# Patient Record
Sex: MALE | Race: WHITE | NOT HISPANIC OR LATINO | Employment: OTHER | URBAN - METROPOLITAN AREA
[De-identification: names, ages, dates, MRNs, and addresses within clinical notes are randomized per-mention and may not be internally consistent; named-entity substitution may affect disease eponyms.]

---

## 2021-01-28 ENCOUNTER — IMMUNIZATIONS (OUTPATIENT)
Dept: FAMILY MEDICINE CLINIC | Facility: HOSPITAL | Age: 86
End: 2021-01-28

## 2021-01-28 DIAGNOSIS — Z23 ENCOUNTER FOR IMMUNIZATION: Primary | ICD-10-CM

## 2021-01-28 PROCEDURE — 91301 SARS-COV-2 / COVID-19 MRNA VACCINE (MODERNA) 100 MCG: CPT

## 2021-01-28 PROCEDURE — 0011A SARS-COV-2 / COVID-19 MRNA VACCINE (MODERNA) 100 MCG: CPT

## 2021-02-08 ENCOUNTER — TELEPHONE (OUTPATIENT)
Dept: GASTROENTEROLOGY | Facility: CLINIC | Age: 86
End: 2021-02-08

## 2021-02-08 NOTE — TELEPHONE ENCOUNTER
Patient called  He would like to schedule appt with Dr José Blunt  He said he would be willing to see a different provider so that he could be seen as soon as possible if Dr José Blunt doesn't have an available appt soon  He is having constipation and a lot of gas  He is taking laxatives with not much relief  Please call patient to schedule ov   Thank you

## 2021-02-11 ENCOUNTER — OFFICE VISIT (OUTPATIENT)
Dept: GASTROENTEROLOGY | Facility: CLINIC | Age: 86
End: 2021-02-11
Payer: COMMERCIAL

## 2021-02-11 VITALS
SYSTOLIC BLOOD PRESSURE: 148 MMHG | DIASTOLIC BLOOD PRESSURE: 76 MMHG | WEIGHT: 143 LBS | HEART RATE: 78 BPM | HEIGHT: 65 IN | BODY MASS INDEX: 23.82 KG/M2

## 2021-02-11 DIAGNOSIS — K59.09 OTHER CONSTIPATION: Primary | ICD-10-CM

## 2021-02-11 DIAGNOSIS — Z86.010 HISTORY OF ADENOMATOUS POLYP OF COLON: ICD-10-CM

## 2021-02-11 PROCEDURE — 99204 OFFICE O/P NEW MOD 45 MIN: CPT | Performed by: INTERNAL MEDICINE

## 2021-02-11 PROCEDURE — 1036F TOBACCO NON-USER: CPT | Performed by: INTERNAL MEDICINE

## 2021-02-11 PROCEDURE — 1160F RVW MEDS BY RX/DR IN RCRD: CPT | Performed by: INTERNAL MEDICINE

## 2021-02-11 RX ORDER — DOXAZOSIN MESYLATE 4 MG/1
TABLET ORAL
COMMUNITY
Start: 2020-11-16

## 2021-02-11 RX ORDER — ATORVASTATIN CALCIUM 10 MG/1
10 TABLET, FILM COATED ORAL DAILY
COMMUNITY

## 2021-02-11 RX ORDER — LORAZEPAM 1 MG/1
1 TABLET ORAL
COMMUNITY

## 2021-02-11 RX ORDER — GABAPENTIN 300 MG/1
300 CAPSULE ORAL 2 TIMES DAILY
COMMUNITY

## 2021-02-11 RX ORDER — POLYETHYLENE GLYCOL 3350 17 G/17G
17 POWDER, FOR SOLUTION ORAL DAILY
COMMUNITY

## 2021-02-11 NOTE — PROGRESS NOTES
[facility] - Outpatient Consultation  Gabriella Rooney 80 y o  male MRN: [id number]  Encounter: [id number]          ASSESSMENT AND PLAN:      1  Other constipation    2  History of adenomatous polyp of colon        History of constipation, denies any blood in stools, colonoscopy about 4 years ago had small polyp  Clinically no evidence of acute abdomen ileus obstruction  Encouraged him to increase fluid and fiber intake stool soft softener, and take MiraLax as it seems to be working  If any further symptoms they will call for a follow-up  Discussed with patient's wife as well   ______________________________________________________________________    HPI:        51-year-old pleasant Pleasant male, history of constipation, more so lately, taking MiraLax with some relief  Denies any blood in stools, denies any abdominal pain nausea vomiting dysphagia coughing choking spells nocturnal reflux regurgitation bronchitis pneumonias  Diet medications more than 10 pertinent systems reviewed  Denies any history of CVA seizures CAD  Quite alert awake independent for his age  Does consume a fair amount of fluid and fiber  REVIEW OF SYSTEMS:    CONSTITUTIONAL: Denies any fever, chills, rigors, and weight loss  HEENT: No earache or tinnitus  CARDIOVASCULAR: No chest pain or palpitations  RESPIRATORY: Denies any cough, hemoptysis, shortness of breath or dyspnea on exertion  GASTROINTESTINAL: As noted in the History of Present Illness  GENITOURINARY: Denies any hematuria or dysuria  NEUROLOGIC: No dizziness or vertigo  MUSCULOSKELETAL: Denies any joint swellings  SKIN: Denies skin rashes or itching  ENDOCRINE: Denies excessive thirst  Denies intolerance to heat or cold  PSYCHOSOCIAL: Denies depression or anxiety  Denies any recent memory loss         Historical Information   Past Medical History:   Diagnosis Date    Coronary artery disease     Hyperlipidemia      Past Surgical History:   Procedure Laterality Date    APPENDECTOMY      TOTAL HIP ARTHROPLASTY       Social History   Social History     Substance and Sexual Activity   Alcohol Use Yes     Social History     Substance and Sexual Activity   Drug Use Never     Social History     Tobacco Use   Smoking Status Former Smoker   Smokeless Tobacco Never Used     History reviewed  No pertinent family history  Meds/Allergies       Current Outpatient Medications:     ASPIRIN 81 PO    atorvastatin (LIPITOR) 10 mg tablet    doxazosin (CARDURA) 4 mg tablet    gabapentin (NEURONTIN) 300 mg capsule    LORazepam (ATIVAN) 1 mg tablet    polyethylene glycol (GlycoLax) 17 GM/SCOOP powder    No Known Allergies        Objective     Blood pressure 148/76, pulse 78, height 5' 5" (1 651 m), weight 64 9 kg (143 lb)  Body mass index is 23 8 kg/m²  PHYSICAL EXAM:      General Appearance:   Alert, cooperative, no distress   HEENT:   Normocephalic, atraumatic, anicteric  Neck:  Supple, symmetrical, trachea midline   Lungs:   Clear to auscultation bilaterally; no rales, rhonchi or wheezing; respirations unlabored    Heart[de-identified]   Regular rate and rhythm; no murmur  Abdomen:   Soft, non-tender, non-distended; normal bowel sounds; no masses, no organomegaly    Genitalia:   Deferred    Rectal:   Deferred    Extremities:  No cyanosis, clubbing or edema    Skin:  No jaundice, rashes, or lesions    Lymph nodes:  No palpable cervical lymphadenopathy        Lab Results:   No visits with results within 1 Day(s) from this visit  Latest known visit with results is:   No results found for any previous visit  Radiology Results:   No results found

## 2021-02-24 ENCOUNTER — IMMUNIZATIONS (OUTPATIENT)
Dept: FAMILY MEDICINE CLINIC | Facility: HOSPITAL | Age: 86
End: 2021-02-24

## 2021-02-24 DIAGNOSIS — Z23 ENCOUNTER FOR IMMUNIZATION: Primary | ICD-10-CM

## 2021-02-24 PROCEDURE — 0012A SARS-COV-2 / COVID-19 MRNA VACCINE (MODERNA) 100 MCG: CPT

## 2021-02-24 PROCEDURE — 91301 SARS-COV-2 / COVID-19 MRNA VACCINE (MODERNA) 100 MCG: CPT

## 2021-05-26 ENCOUNTER — APPOINTMENT (OUTPATIENT)
Dept: LAB | Facility: HOSPITAL | Age: 86
End: 2021-05-26
Attending: OTOLARYNGOLOGY
Payer: COMMERCIAL

## 2021-05-26 DIAGNOSIS — K11.7 XEROSTOMIA: ICD-10-CM

## 2021-05-26 LAB
BUN SERPL-MCNC: 15 MG/DL (ref 5–25)
CREAT SERPL-MCNC: 0.76 MG/DL (ref 0.6–1.3)
GFR SERPL CREATININE-BSD FRML MDRD: 82 ML/MIN/1.73SQ M

## 2021-05-26 PROCEDURE — 36415 COLL VENOUS BLD VENIPUNCTURE: CPT

## 2021-05-26 PROCEDURE — 84520 ASSAY OF UREA NITROGEN: CPT

## 2021-05-26 PROCEDURE — 82565 ASSAY OF CREATININE: CPT

## 2021-05-28 ENCOUNTER — HOSPITAL ENCOUNTER (OUTPATIENT)
Dept: CT IMAGING | Facility: HOSPITAL | Age: 86
Discharge: HOME | End: 2021-05-28
Attending: OTOLARYNGOLOGY
Payer: COMMERCIAL

## 2021-05-28 DIAGNOSIS — R07.0 THROAT PAIN IN ADULT: ICD-10-CM

## 2021-05-28 PROCEDURE — 70491 CT SOFT TISSUE NECK W/DYE: CPT

## 2021-05-28 RX ADMIN — IOHEXOL 100 ML: 350 INJECTION, SOLUTION INTRAVENOUS at 15:45

## 2021-06-14 ENCOUNTER — OFFICE VISIT (OUTPATIENT)
Dept: URGENT CARE | Facility: MEDICAL CENTER | Age: 86
End: 2021-06-14
Payer: COMMERCIAL

## 2021-06-14 VITALS
WEIGHT: 144 LBS | HEART RATE: 90 BPM | OXYGEN SATURATION: 96 % | TEMPERATURE: 98.4 F | BODY MASS INDEX: 22.6 KG/M2 | HEIGHT: 67 IN

## 2021-06-14 DIAGNOSIS — R50.9 FEVER, UNSPECIFIED FEVER CAUSE: Primary | ICD-10-CM

## 2021-06-14 LAB
SL AMB  POCT GLUCOSE, UA: NORMAL
SL AMB LEUKOCYTE ESTERASE,UA: NORMAL
SL AMB POCT BILIRUBIN,UA: NORMAL
SL AMB POCT BLOOD,UA: NORMAL
SL AMB POCT CLARITY,UA: CLEAR
SL AMB POCT COLOR,UA: YELLOW
SL AMB POCT KETONES,UA: NORMAL
SL AMB POCT NITRITE,UA: NORMAL
SL AMB POCT PH,UA: 7.5
SL AMB POCT SPECIFIC GRAVITY,UA: 1
SL AMB POCT URINE PROTEIN: NORMAL
SL AMB POCT UROBILINOGEN: 0.2

## 2021-06-14 PROCEDURE — U0003 INFECTIOUS AGENT DETECTION BY NUCLEIC ACID (DNA OR RNA); SEVERE ACUTE RESPIRATORY SYNDROME CORONAVIRUS 2 (SARS-COV-2) (CORONAVIRUS DISEASE [COVID-19]), AMPLIFIED PROBE TECHNIQUE, MAKING USE OF HIGH THROUGHPUT TECHNOLOGIES AS DESCRIBED BY CMS-2020-01-R: HCPCS | Performed by: PHYSICIAN ASSISTANT

## 2021-06-14 PROCEDURE — 87086 URINE CULTURE/COLONY COUNT: CPT | Performed by: PHYSICIAN ASSISTANT

## 2021-06-14 PROCEDURE — 99213 OFFICE O/P EST LOW 20 MIN: CPT | Performed by: PHYSICIAN ASSISTANT

## 2021-06-14 PROCEDURE — 81002 URINALYSIS NONAUTO W/O SCOPE: CPT | Performed by: PHYSICIAN ASSISTANT

## 2021-06-14 PROCEDURE — U0005 INFEC AGEN DETEC AMPLI PROBE: HCPCS | Performed by: PHYSICIAN ASSISTANT

## 2021-06-14 NOTE — PATIENT INSTRUCTIONS
Fever  Over the counter tylenol  covid test sent  Follow up with PCP in 3-5 days  Proceed to  ER if symptoms worsen  Pharyngitis   WHAT YOU NEED TO KNOW:   Pharyngitis, or sore throat, is inflammation of the tissues and structures in your pharynx (throat)  Pharyngitis is most often caused by bacteria  It may also be caused by a cold or flu virus  Other causes include smoking, allergies, or acid reflux  DISCHARGE INSTRUCTIONS:   Call 911 for any of the following:   · You have trouble breathing or swallowing because your throat is swollen or sore  Return to the emergency department if:   · You are drooling because it hurts too much to swallow  · Your fever is higher than 102? F (39?C) or lasts longer than 3 days  · You are confused  · You taste blood in your throat  Contact your healthcare provider if:   · Your throat pain gets worse  · You have a painful lump in your throat that does not go away after 5 days  · Your symptoms do not improve after 5 days  · You have questions or concerns about your condition or care  Medicines:  Viral pharyngitis will go away on its own without treatment  Your sore throat should start to feel better in 3 to 5 days for both viral and bacterial infections  You may need any of the following:  · Antibiotics  treat a bacterial infection  · NSAIDs , such as ibuprofen, help decrease swelling, pain, and fever  NSAIDs can cause stomach bleeding or kidney problems in certain people  If you take blood thinner medicine, always ask your healthcare provider if NSAIDs are safe for you  Always read the medicine label and follow directions  · Acetaminophen  decreases pain and fever  It is available without a doctor's order  Ask how much to take and how often to take it  Follow directions  Acetaminophen can cause liver damage if not taken correctly  · Take your medicine as directed    Contact your healthcare provider if you think your medicine is not helping or if you have side effects  Tell him or her if you are allergic to any medicine  Keep a list of the medicines, vitamins, and herbs you take  Include the amounts, and when and why you take them  Bring the list or the pill bottles to follow-up visits  Carry your medicine list with you in case of an emergency  Manage your symptoms:   · Gargle salt water  Mix ¼ teaspoon salt in an 8 ounce glass of warm water and gargle  This may help decrease swelling in your throat  · Drink liquids as directed  You may need to drink more liquids than usual  Liquids may help soothe your throat and prevent dehydration  Ask how much liquid to drink each day and which liquids are best for you  · Use a cool-steam humidifier  to help moisten the air in your room and calm your cough  · Soothe your throat  with cough drops, ice, soft foods, or popsicles  Prevent the spread of pharyngitis:  Cover your mouth and nose when you cough or sneeze  Do not share food or drinks  Wash your hands often  Use soap and water  If soap and water are unavailable, use an alcohol based hand   Follow up with your healthcare provider as directed:  Write down your questions so you remember to ask them during your visits  © Copyright [address] Information is for End User's use only and may not be sold, redistributed or otherwise used for commercial purposes  All illustrations and images included in CareNotes® are the copyrighted property of [employer]  or [employer]   The above information is an  only  It is not intended as medical advice for individual conditions or treatments  Talk to your doctor, nurse or pharmacist before following any medical regimen to see if it is safe and effective for you

## 2021-06-14 NOTE — PROGRESS NOTES
[address] [facility]        NAME: Lucinda Morse is a 80 y o  male  : 1933    MRN: [id number]  DATE: 2021  TIME: 10:33 AM    Assessment and Plan   Fever, unspecified fever cause [R50 9]  1  Fever, unspecified fever cause           Patient Instructions     Fever  Over the counter tylenol  covid test sent  Follow up with PCP in 3-5 days  Proceed to  ER if symptoms worsen  Chief Complaint     Chief Complaint   Patient presents with    Fever     x2 days with a fever (highest recorded 102  0F)    Nausea         History of Present Illness       79 y/o male presents c/o fever, chills and frequent urination x 2 days  Denies chest pain, SOB, nausea, vomiting  Patient states he was fully vaccinated for covid 19  Patient is with daughter, comfortable      Review of Systems   Review of Systems   Constitutional: Positive for chills and fever  HENT: Negative  Eyes: Negative  Respiratory: Negative  Negative for apnea, cough, choking, chest tightness, shortness of breath, wheezing and stridor  Cardiovascular: Negative  Negative for chest pain  Genitourinary: Positive for frequency           Current Medications       Current Outpatient Medications:     ASPIRIN 81 PO, Take 81 mg by mouth daily, Disp: , Rfl:     atorvastatin (LIPITOR) 10 mg tablet, Take 10 mg by mouth daily, Disp: , Rfl:     doxazosin (CARDURA) 4 mg tablet, , Disp: , Rfl:     gabapentin (NEURONTIN) 300 mg capsule, Take 300 mg by mouth 3 (three) times a day, Disp: , Rfl:     LORazepam (ATIVAN) 1 mg tablet, Take 1 mg by mouth daily at bedtime, Disp: , Rfl:     polyethylene glycol (GlycoLax) 17 GM/SCOOP powder, Take 17 g by mouth daily, Disp: , Rfl:     sertraline (ZOLOFT) 50 mg tablet, , Disp: , Rfl:     Current Allergies     Allergies as of 2021    (No Known Allergies)            The following portions of the patient's history were reviewed and updated as appropriate: allergies, current medications, past family history, past medical history, past social history, past surgical history and problem list      Past Medical History:   Diagnosis Date    Coronary artery disease     Hyperlipidemia        Past Surgical History:   Procedure Laterality Date    APPENDECTOMY      TOTAL HIP ARTHROPLASTY         History reviewed  No pertinent family history  Medications have been verified  Objective   Pulse 90   Temp 98 4 °F (36 9 °C)   Ht 5' 7" (1 702 m)   Wt 65 3 kg (144 lb)   SpO2 96%   BMI 22 55 kg/m²        Physical Exam     Physical Exam  Constitutional:       General: He is not in acute distress  Appearance: He is well-developed and normal weight  He is not diaphoretic  Cardiovascular:      Rate and Rhythm: Normal rate and regular rhythm  Heart sounds: Normal heart sounds  Pulmonary:      Effort: Pulmonary effort is normal  No respiratory distress  Breath sounds: Normal breath sounds  No wheezing or rales  Chest:      Chest wall: No tenderness  Musculoskeletal:      Cervical back: Normal range of motion and neck supple  Lymphadenopathy:      Cervical: No cervical adenopathy  Neurological:      Mental Status: He is alert

## 2021-06-15 LAB
BACTERIA UR CULT: NORMAL
SARS-COV-2 RNA RESP QL NAA+PROBE: NEGATIVE

## 2021-06-17 ENCOUNTER — APPOINTMENT (EMERGENCY)
Dept: RADIOLOGY | Facility: HOSPITAL | Age: 86
DRG: 178 | End: 2021-06-17
Payer: COMMERCIAL

## 2021-06-17 ENCOUNTER — HOSPITAL ENCOUNTER (INPATIENT)
Facility: HOSPITAL | Age: 86
LOS: 3 days | Discharge: HOME HEALTH | DRG: 178 | End: 2021-06-20
Attending: EMERGENCY MEDICINE | Admitting: INTERNAL MEDICINE
Payer: COMMERCIAL

## 2021-06-17 DIAGNOSIS — R53.1 WEAKNESS: ICD-10-CM

## 2021-06-17 DIAGNOSIS — J18.9 COMMUNITY ACQUIRED PNEUMONIA: Primary | ICD-10-CM

## 2021-06-17 PROBLEM — R13.10 DYSPHAGIA: Status: ACTIVE | Noted: 2021-06-17

## 2021-06-17 PROBLEM — I10 HYPERTENSION: Status: ACTIVE | Noted: 2021-06-17

## 2021-06-17 PROBLEM — R19.7 DIARRHEA: Status: ACTIVE | Noted: 2021-06-17

## 2021-06-17 PROBLEM — J69.0 ASPIRATION PNEUMONIA (HCC): Status: ACTIVE | Noted: 2021-06-17

## 2021-06-17 PROBLEM — F41.9 ANXIETY: Status: ACTIVE | Noted: 2021-06-17

## 2021-06-17 PROBLEM — R26.2 AMBULATORY DYSFUNCTION: Status: ACTIVE | Noted: 2021-06-17

## 2021-06-17 PROBLEM — R06.02 SHORTNESS OF BREATH: Status: ACTIVE | Noted: 2021-06-17

## 2021-06-17 PROBLEM — E78.5 DYSLIPIDEMIA: Status: ACTIVE | Noted: 2021-06-17

## 2021-06-17 LAB
ALBUMIN SERPL BCP-MCNC: 2.7 G/DL (ref 3.5–5)
ALP SERPL-CCNC: 78 U/L (ref 46–116)
ALT SERPL W P-5'-P-CCNC: 40 U/L (ref 12–78)
ANION GAP SERPL CALCULATED.3IONS-SCNC: 9 MMOL/L (ref 4–13)
APTT PPP: 35 SECONDS (ref 23–37)
AST SERPL W P-5'-P-CCNC: 67 U/L (ref 5–45)
ATRIAL RATE: 79 BPM
BACTERIA UR QL AUTO: NORMAL /HPF
BASOPHILS # BLD AUTO: 0.01 THOUSANDS/ÂΜL (ref 0–0.1)
BASOPHILS NFR BLD AUTO: 0 % (ref 0–1)
BILIRUB SERPL-MCNC: 0.55 MG/DL (ref 0.2–1)
BILIRUB UR QL STRIP: NEGATIVE
BUN SERPL-MCNC: 16 MG/DL (ref 5–25)
CALCIUM ALBUM COR SERPL-MCNC: 9.5 MG/DL (ref 8.3–10.1)
CALCIUM SERPL-MCNC: 8.5 MG/DL (ref 8.3–10.1)
CHLORIDE SERPL-SCNC: 102 MMOL/L (ref 100–108)
CLARITY UR: CLEAR
CO2 SERPL-SCNC: 27 MMOL/L (ref 21–32)
COLOR UR: YELLOW
CREAT SERPL-MCNC: 0.82 MG/DL (ref 0.6–1.3)
EOSINOPHIL # BLD AUTO: 0 THOUSAND/ÂΜL (ref 0–0.61)
EOSINOPHIL NFR BLD AUTO: 0 % (ref 0–6)
ERYTHROCYTE [DISTWIDTH] IN BLOOD BY AUTOMATED COUNT: 12.8 % (ref 11.6–15.1)
GFR SERPL CREATININE-BSD FRML MDRD: 79 ML/MIN/1.73SQ M
GLUCOSE SERPL-MCNC: 128 MG/DL (ref 65–140)
GLUCOSE UR STRIP-MCNC: NEGATIVE MG/DL
HCT VFR BLD AUTO: 37.3 % (ref 36.5–49.3)
HGB BLD-MCNC: 12.7 G/DL (ref 12–17)
HGB UR QL STRIP.AUTO: ABNORMAL
IMM GRANULOCYTES # BLD AUTO: 0.04 THOUSAND/UL (ref 0–0.2)
IMM GRANULOCYTES NFR BLD AUTO: 1 % (ref 0–2)
INR PPP: 1.11 (ref 0.84–1.19)
KETONES UR STRIP-MCNC: NEGATIVE MG/DL
L PNEUMO1 AG UR QL IA.RAPID: NEGATIVE
LACTATE SERPL-SCNC: 1.1 MMOL/L (ref 0.5–2)
LEUKOCYTE ESTERASE UR QL STRIP: NEGATIVE
LYMPHOCYTES # BLD AUTO: 0.56 THOUSANDS/ÂΜL (ref 0.6–4.47)
LYMPHOCYTES NFR BLD AUTO: 7 % (ref 14–44)
MCH RBC QN AUTO: 30.6 PG (ref 26.8–34.3)
MCHC RBC AUTO-ENTMCNC: 34 G/DL (ref 31.4–37.4)
MCV RBC AUTO: 90 FL (ref 82–98)
MONOCYTES # BLD AUTO: 0.64 THOUSAND/ÂΜL (ref 0.17–1.22)
MONOCYTES NFR BLD AUTO: 9 % (ref 4–12)
NEUTROPHILS # BLD AUTO: 6.31 THOUSANDS/ÂΜL (ref 1.85–7.62)
NEUTS SEG NFR BLD AUTO: 83 % (ref 43–75)
NITRITE UR QL STRIP: NEGATIVE
NON-SQ EPI CELLS URNS QL MICRO: NORMAL /HPF
NRBC BLD AUTO-RTO: 0 /100 WBCS
P AXIS: 59 DEGREES
PH UR STRIP.AUTO: 6 [PH]
PLATELET # BLD AUTO: 165 THOUSANDS/UL (ref 149–390)
PMV BLD AUTO: 10.6 FL (ref 8.9–12.7)
POTASSIUM SERPL-SCNC: 3.6 MMOL/L (ref 3.5–5.3)
PR INTERVAL: 128 MS
PROCALCITONIN SERPL-MCNC: 0.55 NG/ML
PROT SERPL-MCNC: 7.1 G/DL (ref 6.4–8.2)
PROT UR STRIP-MCNC: NEGATIVE MG/DL
PROTHROMBIN TIME: 14.4 SECONDS (ref 11.6–14.5)
QRS AXIS: 39 DEGREES
QRSD INTERVAL: 84 MS
QT INTERVAL: 360 MS
QTC INTERVAL: 405 MS
RBC # BLD AUTO: 4.15 MILLION/UL (ref 3.88–5.62)
RBC #/AREA URNS AUTO: NORMAL /HPF
S PNEUM AG UR QL: NEGATIVE
SARS-COV-2 RNA RESP QL NAA+PROBE: NEGATIVE
SODIUM SERPL-SCNC: 138 MMOL/L (ref 136–145)
SP GR UR STRIP.AUTO: 1.01 (ref 1–1.03)
T WAVE AXIS: 58 DEGREES
TROPONIN I SERPL-MCNC: <0.02 NG/ML
UROBILINOGEN UR QL STRIP.AUTO: 1 E.U./DL
VENTRICULAR RATE: 76 BPM
WBC # BLD AUTO: 7.56 THOUSAND/UL (ref 4.31–10.16)
WBC #/AREA URNS AUTO: NORMAL /HPF

## 2021-06-17 PROCEDURE — 99285 EMERGENCY DEPT VISIT HI MDM: CPT

## 2021-06-17 PROCEDURE — 80053 COMPREHEN METABOLIC PANEL: CPT | Performed by: EMERGENCY MEDICINE

## 2021-06-17 PROCEDURE — 36415 COLL VENOUS BLD VENIPUNCTURE: CPT | Performed by: EMERGENCY MEDICINE

## 2021-06-17 PROCEDURE — 84145 PROCALCITONIN (PCT): CPT | Performed by: EMERGENCY MEDICINE

## 2021-06-17 PROCEDURE — 87449 NOS EACH ORGANISM AG IA: CPT | Performed by: INTERNAL MEDICINE

## 2021-06-17 PROCEDURE — U0003 INFECTIOUS AGENT DETECTION BY NUCLEIC ACID (DNA OR RNA); SEVERE ACUTE RESPIRATORY SYNDROME CORONAVIRUS 2 (SARS-COV-2) (CORONAVIRUS DISEASE [COVID-19]), AMPLIFIED PROBE TECHNIQUE, MAKING USE OF HIGH THROUGHPUT TECHNOLOGIES AS DESCRIBED BY CMS-2020-01-R: HCPCS | Performed by: EMERGENCY MEDICINE

## 2021-06-17 PROCEDURE — U0005 INFEC AGEN DETEC AMPLI PROBE: HCPCS | Performed by: EMERGENCY MEDICINE

## 2021-06-17 PROCEDURE — 85610 PROTHROMBIN TIME: CPT | Performed by: EMERGENCY MEDICINE

## 2021-06-17 PROCEDURE — 99285 EMERGENCY DEPT VISIT HI MDM: CPT | Performed by: EMERGENCY MEDICINE

## 2021-06-17 PROCEDURE — 93005 ELECTROCARDIOGRAM TRACING: CPT

## 2021-06-17 PROCEDURE — 96361 HYDRATE IV INFUSION ADD-ON: CPT

## 2021-06-17 PROCEDURE — 96365 THER/PROPH/DIAG IV INF INIT: CPT

## 2021-06-17 PROCEDURE — 99223 1ST HOSP IP/OBS HIGH 75: CPT | Performed by: INTERNAL MEDICINE

## 2021-06-17 PROCEDURE — 87040 BLOOD CULTURE FOR BACTERIA: CPT | Performed by: EMERGENCY MEDICINE

## 2021-06-17 PROCEDURE — 93010 ELECTROCARDIOGRAM REPORT: CPT | Performed by: INTERNAL MEDICINE

## 2021-06-17 PROCEDURE — 71045 X-RAY EXAM CHEST 1 VIEW: CPT

## 2021-06-17 PROCEDURE — 81001 URINALYSIS AUTO W/SCOPE: CPT | Performed by: EMERGENCY MEDICINE

## 2021-06-17 PROCEDURE — 96367 TX/PROPH/DG ADDL SEQ IV INF: CPT

## 2021-06-17 PROCEDURE — 84484 ASSAY OF TROPONIN QUANT: CPT | Performed by: EMERGENCY MEDICINE

## 2021-06-17 PROCEDURE — 85730 THROMBOPLASTIN TIME PARTIAL: CPT | Performed by: EMERGENCY MEDICINE

## 2021-06-17 PROCEDURE — 85025 COMPLETE CBC W/AUTO DIFF WBC: CPT | Performed by: EMERGENCY MEDICINE

## 2021-06-17 PROCEDURE — 83605 ASSAY OF LACTIC ACID: CPT | Performed by: EMERGENCY MEDICINE

## 2021-06-17 RX ORDER — ACETAMINOPHEN 325 MG/1
650 TABLET ORAL EVERY 6 HOURS PRN
Status: DISCONTINUED | OUTPATIENT
Start: 2021-06-17 | End: 2021-06-20 | Stop reason: HOSPADM

## 2021-06-17 RX ORDER — LORAZEPAM 1 MG/1
1 TABLET ORAL
Status: DISCONTINUED | OUTPATIENT
Start: 2021-06-17 | End: 2021-06-20 | Stop reason: HOSPADM

## 2021-06-17 RX ORDER — ONDANSETRON 2 MG/ML
4 INJECTION INTRAMUSCULAR; INTRAVENOUS EVERY 6 HOURS PRN
Status: DISCONTINUED | OUTPATIENT
Start: 2021-06-17 | End: 2021-06-20 | Stop reason: HOSPADM

## 2021-06-17 RX ORDER — GABAPENTIN 300 MG/1
600 CAPSULE ORAL
Status: DISCONTINUED | OUTPATIENT
Start: 2021-06-17 | End: 2021-06-20 | Stop reason: HOSPADM

## 2021-06-17 RX ORDER — ASPIRIN 81 MG/1
81 TABLET, CHEWABLE ORAL DAILY
Status: DISCONTINUED | OUTPATIENT
Start: 2021-06-17 | End: 2021-06-20 | Stop reason: HOSPADM

## 2021-06-17 RX ORDER — DOXAZOSIN MESYLATE 4 MG/1
4 TABLET ORAL DAILY
Status: DISCONTINUED | OUTPATIENT
Start: 2021-06-18 | End: 2021-06-20 | Stop reason: HOSPADM

## 2021-06-17 RX ORDER — MAGNESIUM HYDROXIDE/ALUMINUM HYDROXICE/SIMETHICONE 120; 1200; 1200 MG/30ML; MG/30ML; MG/30ML
30 SUSPENSION ORAL EVERY 6 HOURS PRN
Status: DISCONTINUED | OUTPATIENT
Start: 2021-06-17 | End: 2021-06-20 | Stop reason: HOSPADM

## 2021-06-17 RX ORDER — CIPROFLOXACIN 500 MG/1
500 TABLET, FILM COATED ORAL EVERY 12 HOURS SCHEDULED
COMMUNITY
End: 2021-06-20 | Stop reason: HOSPADM

## 2021-06-17 RX ORDER — LORAZEPAM 1 MG/1
1 TABLET ORAL
Status: DISCONTINUED | OUTPATIENT
Start: 2021-06-17 | End: 2021-06-17

## 2021-06-17 RX ORDER — GABAPENTIN 300 MG/1
300 CAPSULE ORAL 2 TIMES DAILY
Status: DISCONTINUED | OUTPATIENT
Start: 2021-06-17 | End: 2021-06-17

## 2021-06-17 RX ORDER — GABAPENTIN 300 MG/1
300 CAPSULE ORAL DAILY
Status: DISCONTINUED | OUTPATIENT
Start: 2021-06-18 | End: 2021-06-20 | Stop reason: HOSPADM

## 2021-06-17 RX ORDER — SODIUM CHLORIDE 9 MG/ML
100 INJECTION, SOLUTION INTRAVENOUS CONTINUOUS
Status: DISPENSED | OUTPATIENT
Start: 2021-06-17 | End: 2021-06-18

## 2021-06-17 RX ORDER — ATORVASTATIN CALCIUM 10 MG/1
10 TABLET, FILM COATED ORAL DAILY
Status: DISCONTINUED | OUTPATIENT
Start: 2021-06-18 | End: 2021-06-20 | Stop reason: HOSPADM

## 2021-06-17 RX ADMIN — ASPIRIN 81 MG: 81 TABLET, CHEWABLE ORAL at 16:27

## 2021-06-17 RX ADMIN — VANCOMYCIN HYDROCHLORIDE 1250 MG: 5 INJECTION, POWDER, LYOPHILIZED, FOR SOLUTION INTRAVENOUS at 13:10

## 2021-06-17 RX ADMIN — ENOXAPARIN SODIUM 40 MG: 40 INJECTION SUBCUTANEOUS at 16:27

## 2021-06-17 RX ADMIN — SODIUM CHLORIDE 1000 ML: 0.9 INJECTION, SOLUTION INTRAVENOUS at 11:20

## 2021-06-17 RX ADMIN — ACETAMINOPHEN 650 MG: 325 TABLET, FILM COATED ORAL at 16:27

## 2021-06-17 RX ADMIN — LORAZEPAM 1 MG: 1 TABLET ORAL at 21:04

## 2021-06-17 RX ADMIN — GABAPENTIN 600 MG: 300 CAPSULE ORAL at 21:04

## 2021-06-17 RX ADMIN — SODIUM CHLORIDE 3 G: 9 INJECTION, SOLUTION INTRAVENOUS at 21:04

## 2021-06-17 RX ADMIN — SODIUM CHLORIDE 3 G: 9 INJECTION, SOLUTION INTRAVENOUS at 16:27

## 2021-06-17 RX ADMIN — SODIUM CHLORIDE 100 ML/HR: 0.9 INJECTION, SOLUTION INTRAVENOUS at 16:28

## 2021-06-17 RX ADMIN — CEFEPIME HYDROCHLORIDE 2000 MG: 2 INJECTION, POWDER, FOR SOLUTION INTRAVENOUS at 12:23

## 2021-06-17 NOTE — PLAN OF CARE
Problem: MOBILITY - ADULT  Goal: Maintain or return to baseline ADL function  Description: INTERVENTIONS:  -  Assess patient's ability to carry out ADLs; assess patient's baseline for ADL function and identify physical deficits which impact ability to perform ADLs (bathing, care of mouth/teeth, toileting, grooming, dressing, etc )  - Assess/evaluate cause of self-care deficits   - Assess range of motion  - Assess patient's mobility; develop plan if impaired  - Assess patient's need for assistive devices and provide as appropriate  - Encourage maximum independence but intervene and supervise when necessary  - Involve family in performance of ADLs  - Assess for home care needs following discharge   - Consider OT consult to assist with ADL evaluation and planning for discharge  - Provide patient education as appropriate  Outcome: Progressing       Problem: SAFETY ADULT  Goal: Maintain or return to baseline ADL function  Description: INTERVENTIONS:  -  Assess patient's ability to carry out ADLs; assess patient's baseline for ADL function and identify physical deficits which impact ability to perform ADLs (bathing, care of mouth/teeth, toileting, grooming, dressing, etc )  - Assess/evaluate cause of self-care deficits   - Assess range of motion  - Assess patient's mobility; develop plan if impaired  - Assess patient's need for assistive devices and provide as appropriate  - Encourage maximum independence but intervene and supervise when necessary  - Involve family in performance of ADLs  - Assess for home care needs following discharge   - Consider OT consult to assist with ADL evaluation and planning for discharge  - Provide patient education as appropriate  Outcome: Progressing  Goal: Maintains/Returns to pre admission functional level  Description: INTERVENTIONS:  - Perform BMAT or MOVE assessment daily    - Set and communicate daily mobility goal to care team and patient/family/caregiver     - Collaborate with rehabilitation services on mobility goals if consulted  - Perform Range of Motion 2  times a day  - Reposition patient every 2 hours    - Dangle patient 2 times a day  - Stand patient 2 times a day  - Ambulate patient 2 times a day  - Out of bed to chair 2 times a day   - Out of bed for meals 2 times a day  - Out of bed for toileting  - Record patient progress and toleration of activity level   Outcome: Progressing  Goal: Patient will remain free of falls  Description: INTERVENTIONS:  - Educate patient/family on patient safety including physical limitations  - Instruct patient to call for assistance with activity   - Consult OT/PT to assist with strengthening/mobility   - Keep Call bell within reach  - Keep bed low and locked with side rails adjusted as appropriate  - Keep care items and personal belongings within reach  - Initiate and maintain comfort rounds  - Make Fall Risk Sign visible to staff  - Offer Toileting every 2 Hours, in advance of need  - Initiate/Maintain bed alarm  - Obtain necessary fall risk management equipment: bed  - Apply yellow socks and bracelet for high fall risk patients  - Consider moving patient to room near nurses station  Outcome: Progressing

## 2021-06-17 NOTE — ASSESSMENT & PLAN NOTE
Patient usually ambulates with a cane presently with generalized weakness ambulating with walker  Reports feeling generalized weak next  Safe ambulation  Fall precautions  Physical therapy

## 2021-06-17 NOTE — ASSESSMENT & PLAN NOTE
Reports dysphagia  Outpatient ENT input noted  Will request speech therapy input  Aspiration precautions

## 2021-06-17 NOTE — ED PROVIDER NOTES
History  Chief Complaint   Patient presents with    Weakness - Generalized     Patient coming from home via EMS for c/o weakness that has been increasing over the past two weeks  EMS reports patient was incontinent of urine and experiencing urinary frequency  Denies vomiting, reports nausea and dizzness  Denies pain  80-year-old male presents the emergency department by EMS for evaluation generalized weakness, fever, cough with shortness of breath, and urinary incontinence  Patient states that he began to feel unwell 4 days ago  At that time he was feeling short of breath and had cough with low-grade fever  He had a COVID test and an urgent care that was negative  The patient has been fully vaccinated  Over the past 4 days he has had intermittent fever with T-max of 102 0°  He has baseline difficulty with ambulation due to balance issues but has become very weak and unsteady on his feet  He did have 2 falls without injury this morning  Patient notes that he has had several episodes of urinary incontinence  He has had decreased appetite and poor oral intake  Patient appears to have somewhat labored breathing on arrival   He does admit to feeling short of breath and has a dry mostly nonproductive cough  He denies chest pain  No recent travel or sick contacts  Patient did take Tylenol this morning presents with a temperature of 100 1°  He does report having intermittent shaking chills        History provided by:  Patient, medical records, spouse, relative and EMS personnel   used: No    Fever - 75 years or older  Max temp prior to arrival:  102  Temp source:  Oral  Severity:  Severe  Onset quality:  Gradual  Duration:  4 days  Timing:  Intermittent  Progression:  Worsening  Chronicity:  New  Relieved by:  Acetaminophen  Worsened by:  Nothing  Associated symptoms: chills, cough, headaches and somnolence    Associated symptoms: no diarrhea, no dysuria, no rash and no vomiting Associated symptoms comment:  Weakness and urinary incontinence  Risk factors: no sick contacts        Prior to Admission Medications   Prescriptions Last Dose Informant Patient Reported? Taking? ASPIRIN 81 PO 6/17/2021 at Unknown time Self Yes Yes   Sig: Take 81 mg by mouth daily   LORazepam (ATIVAN) 1 mg tablet 6/16/2021 at Unknown time Self Yes Yes   Sig: Take 1 mg by mouth daily at bedtime   atorvastatin (LIPITOR) 10 mg tablet 6/17/2021 at Unknown time Self Yes Yes   Sig: Take 10 mg by mouth daily   ciprofloxacin (CIPRO) 500 mg tablet 6/17/2021 at Unknown time  Yes Yes   Sig: Take 500 mg by mouth every 12 (twelve) hours   doxazosin (CARDURA) 4 mg tablet 6/17/2021 at Unknown time Self Yes Yes   gabapentin (NEURONTIN) 300 mg capsule 6/16/2021 at Unknown time Self Yes Yes   Sig: Take 300 mg by mouth 2 (two) times a day 1 in am, 2 in PM   polyethylene glycol (GlycoLax) 17 GM/SCOOP powder Past Week at Unknown time Self Yes Yes   Sig: Take 17 g by mouth daily   sertraline (ZOLOFT) 50 mg tablet 6/17/2021 at Unknown time  Yes Yes      Facility-Administered Medications: None       Past Medical History:   Diagnosis Date    Coronary artery disease     Hyperlipidemia        Past Surgical History:   Procedure Laterality Date    APPENDECTOMY      TOTAL HIP ARTHROPLASTY         History reviewed  No pertinent family history  I have reviewed and agree with the history as documented  E-Cigarette/Vaping    E-Cigarette Use Never User      E-Cigarette/Vaping Substances    Nicotine No     THC No     CBD No     Flavoring No     Other No     Unknown No      Social History     Tobacco Use    Smoking status: Former Smoker    Smokeless tobacco: Never Used   Vaping Use    Vaping Use: Never used   Substance Use Topics    Alcohol use: Yes    Drug use: Never       Review of Systems   Constitutional: Positive for appetite change, chills and fever  Respiratory: Positive for cough and shortness of breath  Cardiovascular: Negative for palpitations and leg swelling  Gastrointestinal: Negative for diarrhea and vomiting  Genitourinary: Positive for frequency  Negative for dysuria and flank pain  Musculoskeletal: Negative for back pain  Skin: Negative for rash  Neurological: Positive for weakness and headaches  All other systems reviewed and are negative  Physical Exam  Physical Exam  Vitals reviewed  Constitutional:       General: He is in acute distress  Appearance: Normal appearance  He is well-developed  He is ill-appearing  HENT:      Head: Normocephalic and atraumatic  Eyes:      Conjunctiva/sclera: Conjunctivae normal       Pupils: Pupils are equal, round, and reactive to light  Cardiovascular:      Rate and Rhythm: Regular rhythm  Tachycardia present  Heart sounds: Normal heart sounds  Pulmonary:      Effort: Pulmonary effort is normal  No accessory muscle usage or respiratory distress  Breath sounds: Rhonchi present  Chest:      Chest wall: No tenderness  Abdominal:      General: Bowel sounds are normal  There is no distension  Palpations: Abdomen is soft  Tenderness: There is no abdominal tenderness  There is no guarding or rebound  Musculoskeletal:         General: No tenderness or deformity  Normal range of motion  Cervical back: Normal range of motion and neck supple  Lymphadenopathy:      Cervical: No cervical adenopathy  Skin:     General: Skin is warm and dry  Findings: Ecchymosis present  No rash  Neurological:      Mental Status: He is alert and oriented to person, place, and time  Coordination: Coordination normal       Deep Tendon Reflexes: Reflexes are normal and symmetric  Psychiatric:         Behavior: Behavior normal          Thought Content:  Thought content normal          Judgment: Judgment normal          Vital Signs  ED Triage Vitals [06/17/21 1029]   Temperature Pulse Respirations Blood Pressure SpO2 100 1 °F (37 8 °C) 90 16 130/63 98 %      Temp Source Heart Rate Source Patient Position - Orthostatic VS BP Location FiO2 (%)   Oral Monitor Lying Right arm --      Pain Score       --           Vitals:    06/17/21 1029 06/17/21 1300 06/17/21 1546   BP: 130/63 140/65 133/61   Pulse: 90 78 100   Patient Position - Orthostatic VS: Lying  Lying         Visual Acuity      ED Medications  Medications   aspirin chewable tablet 81 mg (has no administration in time range)   atorvastatin (LIPITOR) tablet 10 mg (has no administration in time range)   doxazosin (CARDURA) tablet 4 mg (has no administration in time range)   gabapentin (NEURONTIN) capsule 300 mg (has no administration in time range)   LORazepam (ATIVAN) tablet 1 mg (has no administration in time range)   sertraline (ZOLOFT) tablet 50 mg (has no administration in time range)   sodium chloride 0 9 % infusion (has no administration in time range)   acetaminophen (TYLENOL) tablet 650 mg (has no administration in time range)   ondansetron (ZOFRAN) injection 4 mg (has no administration in time range)   aluminum-magnesium hydroxide-simethicone (MYLANTA) oral suspension 30 mL (has no administration in time range)   enoxaparin (LOVENOX) subcutaneous injection 40 mg (has no administration in time range)   ampicillin-sulbactam (UNASYN) 3 g in sodium chloride 0 9 % 100 mL IVPB (has no administration in time range)   sodium chloride 0 9 % bolus 1,000 mL (0 mL Intravenous Stopped 6/17/21 1320)   cefepime (MAXIPIME) 2 g/50 mL dextrose IVPB (0 mg Intravenous Stopped 6/17/21 1309)   vancomycin (VANCOCIN) 1,250 mg in sodium chloride 0 9 % 250 mL IVPB (1,250 mg Intravenous New Bag 6/17/21 1310)       Diagnostic Studies  Results Reviewed     Procedure Component Value Units Date/Time    Procalcitonin with AM Reflex [[id number]]  (Abnormal) Collected: 06/17/21 1200    Lab Status: Final result Specimen: Blood from Arm, Left Updated: 06/17/21 8335     Procalcitonin 0 55 ng/ml Urine Microscopic [[id number]]  (Normal) Collected: 06/17/21 1223    Lab Status: Final result Specimen: Urine, Clean Catch Updated: 06/17/21 1320     RBC, UA 1-2 /hpf      WBC, UA 0-1 /hpf      Epithelial Cells Occasional /hpf      Bacteria, UA Occasional /hpf     UA w Reflex to Microscopic w Reflex to Culture [[id number]]  (Abnormal) Collected: 06/17/21 1223    Lab Status: Final result Specimen: Urine, Clean Catch Updated: 06/17/21 1318     Color, UA Yellow     Clarity, UA Clear     Specific Gravity, UA 1 010     pH, UA 6 0     Leukocytes, UA Negative     Nitrite, UA Negative     Protein, UA Negative mg/dl      Glucose, UA Negative mg/dl      Ketones, UA Negative mg/dl      Urobilinogen, UA 1 0 E U /dl      Bilirubin, UA Negative     Blood, UA Small    Novel Coronavirus (Covid-19),PCR [facility] - 2 Hour Stat [[id number]]  (Normal) Collected: 06/17/21 1121    Lab Status: Final result Specimen: Nares from Nose Updated: 06/17/21 1226     SARS-CoV-2 Negative    Narrative: The specimen collection materials, transport medium, and/or testing methodology utilized in the production of these test results have been proven to be reliable in a limited validation with an abbreviated program under the Emergency Utilization Authorization provided by the FDA  Testing reported as "Presumptive positive" will be confirmed with secondary testing to ensure result accuracy  Clinical caution and judgement should be used with the interpretation of these results with consideration of the clinical impression and other laboratory testing  Testing reported as "Positive" or "Negative" has been proven to be accurate according to standard laboratory validation requirements  All testing is performed with control materials showing appropriate reactivity at standard intervals        Lactic acid [[id number]]  (Normal) Collected: 06/17/21 1121    Lab Status: Final result Specimen: Blood from Arm, Right Updated: 06/17/21 1209     LACTIC ACID 1 1 mmol/L Narrative:      Result may be elevated if tourniquet was used during collection  Blood culture #1 [[id number]] Collected: 06/17/21 1140    Lab Status:  In process Specimen: Blood from Arm, Left Updated: 06/17/21 1202    Troponin I [[id number]]  (Normal) Collected: 06/17/21 1121    Lab Status: Final result Specimen: Blood from Arm, Right Updated: 06/17/21 1158     Troponin I <0 02 ng/mL     Comprehensive metabolic panel [[id number]]  (Abnormal) Collected: 06/17/21 1121    Lab Status: Final result Specimen: Blood from Arm, Right Updated: 06/17/21 1157     Sodium 138 mmol/L      Potassium 3 6 mmol/L      Chloride 102 mmol/L      CO2 27 mmol/L      ANION GAP 9 mmol/L      BUN 16 mg/dL      Creatinine 0 82 mg/dL      Glucose 128 mg/dL      Calcium 8 5 mg/dL      Corrected Calcium 9 5 mg/dL      AST 67 U/L      ALT 40 U/L      Alkaline Phosphatase 78 U/L      Total Protein 7 1 g/dL      Albumin 2 7 g/dL      Total Bilirubin 0 55 mg/dL      eGFR 79 ml/min/1 73sq m     Narrative:      [facility] guidelines for Chronic Kid[facility] Disease (CKD):     Stage 1 with normal or high GFR (GFR > 90 mL/min/1 73 square meters)    Stage 2 Mild CKD (GFR = 60-89 mL/min/1 73 square meters)    Stage 3A Moderate CKD (GFR = 45-59 mL/min/1 73 square meters)    Stage 3B Moderate CKD (GFR = 30-44 mL/min/1 73 square meters)    Stage 4 Severe CKD (GFR = 15-29 mL/min/1 73 square meters)    Stage 5 End Stage CKD (GFR <15 mL/min/1 73 square meters)  Note: GFR calculation is accurate only with a steady state creatinine    Protime-INR [[id number]]  (Normal) Collected: 06/17/21 1121    Lab Status: Final result Specimen: Blood from Arm, Right Updated: 06/17/21 1153     Protime 14 4 seconds      INR 1 11    APTT [[id number]]  (Normal) Collected: 06/17/21 1121    Lab Status: Final result Specimen: Blood from Arm, Right Updated: 06/17/21 1153     PTT 35 seconds     CBC and differential [[id number]]  (Abnormal) Collected: 06/17/21 1121    Lab Status: Final result Specimen: Blood from Arm, Right Updated: 06/17/21 1134     WBC 7 56 Thousand/uL      RBC 4 15 Million/uL      Hemoglobin 12 7 g/dL      Hematocrit 37 3 %      MCV 90 fL      MCH 30 6 pg      MCHC 34 0 g/dL      RDW 12 8 %      MPV 10 6 fL      Platelets 450 Thousands/uL      nRBC 0 /100 WBCs      Neutrophils Relative 83 %      Immat GRANS % 1 %      Lymphocytes Relative 7 %      Monocytes Relative 9 %      Eosinophils Relative 0 %      Basophils Relative 0 %      Neutrophils Absolute 6 31 Thousands/µL      Immature Grans Absolute 0 04 Thousand/uL      Lymphocytes Absolute 0 56 Thousands/µL      Monocytes Absolute 0 64 Thousand/µL      Eosinophils Absolute 0 00 Thousand/µL      Basophils Absolute 0 01 Thousands/µL     Blood culture #2 [[id number]] Collected: 06/17/21 1121    Lab Status: In process Specimen: Blood from Arm, Right Updated: 06/17/21 1130                 XR chest 1 view portable   ED Interpretation by Saji Perez DO (06/17 1123)   LL infiltrate                 Procedures  ECG 12 Lead Documentation Only    Date/Time: 6/17/2021 1:19 PM  Performed by: Saji Perez DO  Authorized by: Saji Perez DO     Indications / Diagnosis:  Sepsis  ECG reviewed by me, the ED Provider: yes    Patient location:  ED  Previous ECG:     Previous ECG:  Unavailable  Interpretation:     Interpretation: normal    Rate:     ECG rate:  76    ECG rate assessment: normal    Rhythm:     Rhythm: sinus rhythm    Ectopy:     Ectopy: none    QRS:     QRS axis:  Normal  Conduction:     Conduction: normal    ST segments:     ST segments:  Normal  T waves:     T waves: normal               ED Course                         Initial Sepsis Screening     Row Name 06/17/21 1402 06/17/21 1317             Is the patient's history suggestive of a new or worsening infection?   (!) Yes (Proceed)  -AW  (!) Yes (Proceed)  -AW       Suspected source of infection  urinary tract infection;pneumonia  -AW  urinary tract infection;pneumonia  -AW       Are two or more of the following signs & symptoms of infection both present and new to the patient?   No  -AW  No  -AW       Indicate SIRS criteria  --  --       If the answer is yes to both questions, suspicion of sepsis is present  --  --       If severe sepsis is present AND tissue hypoperfusion perists in the hour after fluid resuscitation or lactate > 4, the patient meets criteria for SEPTIC SHOCK  --  --       Are any of the following organ dysfunction criteria present within 6 hours of suspected infection and SIRS criteria that are NOT considered to be chronic conditions?  --  --       Organ dysfunction  --  --       Date of presentation of severe sepsis  --  --       Time of presentation of severe sepsis  --  --       Tissue hypoperfusion persists in the hour after crystalloid fluid administration, evidenced, by either:  --  --       Was hypotension present within one hour of the conclusion of crystalloid fluid administration?  --  --       Date of presentation of septic shock  --  --       Time of presentation of septic shock  --  --         User Key  (r) = Recorded By, (t) = Taken By, (c) = Cosigned By    Initials Name Provider Type    HARDIK Schneider DO Physician                        MDM  Number of Diagnoses or Management Options  Community acquired pneumonia: new and requires workup  Weakness: new and requires workup     Amount and/or Complexity of Data Reviewed  Clinical lab tests: ordered and reviewed  Tests in the radiology section of CPT®: ordered and reviewed  Tests in the medicine section of CPT®: ordered and reviewed  Decide to obtain previous medical records or to obtain history from someone other than the patient: yes  Discuss the patient with other providers: yes  Independent visualization of images, tracings, or specimens: yes    Patient Progress  Patient progress: stable      Disposition  Final diagnoses:   Community acquired pneumonia   Weakness     Time reflects when diagnosis was documented in both MDM as applicable and the Disposition within this note     Time User Action Codes Description Comment    6/17/2021  2:01 PM Lisa Mayer Add [A41 9] Sepsis ([facility] [address] )     6/17/2021  2:02 PM Lisa Mayer Add [J18 9] Community acquired pneumonia     6/17/2021  2:04 PM Lisa Mayer Modify [J18 9] Community acquired pneumonia     6/17/2021  2:04 PM Lisa Mayer Remove [facility]A41 9[facility]     6/17/2021  2:04 PM Steph Fabtamra Add [R53 1] Weakness       ED Disposition     ED Disposition Condition Date/Time Comment    Admit Stable u Jun 17, 2021  2:01 PM Case was discussed with Dr Marietta Pallas and the patient's admission status was agreed to be Admission Status: inpatient status to the service of Dr Marietta Pallas   Follow-up Information    None         Current Discharge Medication List      CONTINUE these medications which have NOT CHANGED    Details   ASPIRIN 81 PO Take 81 mg by mouth daily      atorvastatin (LIPITOR) 10 mg tablet Take 10 mg by mouth daily      ciprofloxacin (CIPRO) 500 mg tablet Take 500 mg by mouth every 12 (twelve) hours      doxazosin (CARDURA) 4 mg tablet       gabapentin (NEURONTIN) 300 mg capsule Take 300 mg by mouth 2 (two) times a day 1 in am, 2 in PM      LORazepam (ATIVAN) 1 mg tablet Take 1 mg by mouth daily at bedtime      polyethylene glycol (GlycoLax) 17 GM/SCOOP powder Take 17 g by mouth daily      sertraline (ZOLOFT) 50 mg tablet            No discharge procedures on file      PDMP Review     None          ED Provider  Electronically Signed by           Merna Overton DO  06/17/21 8517

## 2021-06-17 NOTE — H&P
[facility]  H&P- Fifi [facility] 1933, 80 y o  male MRN: [id number]  Unit/Bed#: S -Hua Encounter: [id number]  Primary Care Provider: Letty Chery MD   Date and time admitted to hospital: 6/17/2021 10:21 AM    * Aspiration pneumonia [facility])  Assessment & Plan  Presents with fever cough not feeling well  x-ray suggestive of possible left-sided pneumonia  COVID negative  IV Unasyn  Aspiration precautions  Will request Speech therapy inputs for swallow evaluation  Monitor counts, temperatures, procalcitonin levels  Follow-up on cultures    Diarrhea  Assessment & Plan  Likely related to febrile illness  Check stool studies  Check stool C diff PCR  Supportive care    Anxiety  Assessment & Plan  Patient was reassured  Continue Zoloft    Hypertension  Assessment & Plan  Continue doxazosin  Monitor blood pressures  Avoid hypotension    Dyslipidemia  Assessment & Plan  Continue statin    Shortness of breath  Assessment & Plan  Reports shortness of breath multifactorial  Anxiety contributing  Monitor closely    Dysphagia  Assessment & Plan  Reports dysphagia  Outpatient ENT input noted  Will request speech therapy input  Aspiration precautions    Ambulatory dysfunction  Assessment & Plan  Patient usually ambulates with a cane presently with generalized weakness ambulating with walker  Reports feeling generalized weak next  Safe ambulation  Fall precautions  Physical therapy    Weakness  Assessment & Plan  Multifactorial deconditioning contributing  Supportive care  Physical therapy      VTE Prophylaxis: Enoxaparin (Lovenox)  / sequential compression device   Code Status:  DNR discussed with the patient  POLST: There is no POLST form on file for this patient (pre-hospital)  Discussion with family:  Discussed the patient, his spouse and daughters are at bedside discussed in detail questions answered    Anticipated Length of Stay:  Patient will be admitted on an Inpatient basis with an anticipated length of stay of  more than 2 midnights  Justification for Hospital Stay:  Febrile illness, diarrhea, possible pneumonia ambulatory dysfunction for management as outlined      Chief Complaint:       Fevers, not feeling well  Generalized weakness    History of Present Illness:    Patrick Collins is a 80 y o  male who presents with not feeling well, fever nausea  Patient was evaluated for these symptoms at an urgent care center couple of days back and placed on ciprofloxacin  Daughter at bedside reports he has taken so far 3 tablets of ciprofloxacin  He continues to feel weak fatigued with episodes of diarrhea hence he presents to the hospital   He noticed fever couple of days back, he has history of dysphagia and reports he chokes on food  No history of cough  Reports feeling short of breath  On inquiry he does report feeling anxious at the moment  Reports watery diarrhea multiple episodes since today  No history of prior antibiotic use prior to 3 dose of ciprofloxacin  No history of C diff in the past   No history of hematochezia hematemesis  Denies abdominal pain  His appetite is fair to good  Usually ambulates with a cane however for the last few days since the acute febrile illness he reports he is feeling very weak and needs walker to ambulate  He reports increased urinary frequency however denies history of dysuria hematuria flank pain  Review of Systems:    Review of Systems   All other systems reviewed and are negative  Past Medical and Surgical History:     Past Medical History:   Diagnosis Date    Coronary artery disease     Hyperlipidemia        Past Surgical History:   Procedure Laterality Date    APPENDECTOMY      TOTAL HIP ARTHROPLASTY         Meds/Allergies:    Prior to Admission medications    Medication Sig Start Date End Date Taking?  Authorizing Provider   ASPIRIN 81 PO Take 81 mg by mouth daily   Yes Historical Provider, MD   atorvastatin (LIPITOR) 10 mg tablet Take 10 mg by mouth daily   Yes Historical Provider, MD   ciprofloxacin (CIPRO) 500 mg tablet Take 500 mg by mouth every 12 (twelve) hours   Yes Historical Provider, MD   doxazosin (CARDURA) 4 mg tablet  11/16/20  Yes Historical Provider, MD   gabapentin (NEURONTIN) 300 mg capsule Take 300 mg by mouth 2 (two) times a day 1 in am, 2 in PM   Yes Historical Provider, MD   LORazepam (ATIVAN) 1 mg tablet Take 1 mg by mouth daily at bedtime   Yes Historical Provider, MD   polyethylene glycol (GlycoLax) 17 GM/SCOOP powder Take 17 g by mouth daily   Yes Historical Provider, MD   sertraline (ZOLOFT) 50 mg tablet  6/6/21  Yes Historical Provider, MD     I have reviewed home medications with patient family member  Allergies: No Known Allergies    Social History:     Marital Status: /Civil Union   Occupation:   Patient Pre-hospital Living Situation: home  Patient Pre-hospital Level of Mobility:  Usually ambulates with a cane  Patient Pre-hospital Diet Restrictions: no  Substance Use History:   Social History     Substance and Sexual Activity   Alcohol Use Yes     Social History     Tobacco Use   Smoking Status Former Smoker   Smokeless Tobacco Never Used     Social History     Substance and Sexual Activity   Drug Use Never       Family History:    History reviewed  No pertinent family history      Physical Exam:     Vitals:   Blood Pressure: 133/61 (06/17/21 1546)  Pulse: 100 (06/17/21 1546)  Temperature: 100 3 °F (37 9 °C) (06/17/21 1546)  Temp Source: Oral (06/17/21 1546)  Respirations: 20 (06/17/21 1546)  Height: 5' 4" (162 6 cm) (06/17/21 1546)  Weight - Scale: 71 kg (156 lb 8 4 oz) (06/17/21 1546)  SpO2: 96 % (06/17/21 1546)    Physical Exam    Comfortably sitting up in bed  Reports feeling anxious and dyspneic at rest but able to complete sentences  "I need to take deep breaths"  Features of Protein calorie malnutrition noted  Neck supple  Lungs diminished breath sounds bilaterally  Heart sounds S1 and S2 noted  Abdomen soft nontender  Awake obeys simple commands  Pulses noted  No pedal edema  No rash    Additional Data:     Lab Results: I have personally reviewed pertinent reports  Results from last 7 days   Lab Units 06/17/21  1121   WBC Thousand/uL 7 56   HEMOGLOBIN g/dL 12 7   HEMATOCRIT % 37 3   PLATELETS Thousands/uL 165   NEUTROS PCT % 83*   LYMPHS PCT % 7*   MONOS PCT % 9   EOS PCT % 0     Results from last 7 days   Lab Units 06/17/21  1121   SODIUM mmol/L 138   POTASSIUM mmol/L 3 6   CHLORIDE mmol/L 102   CO2 mmol/L 27   BUN mg/dL 16   CREATININE mg/dL 0 82   ANION GAP mmol/L 9   CALCIUM mg/dL 8 5   ALBUMIN g/dL 2 7*   TOTAL BILIRUBIN mg/dL 0 55   ALK PHOS U/L 78   ALT U/L 40   AST U/L 67*   GLUCOSE RANDOM mg/dL 128     Results from last 7 days   Lab Units 06/17/21  1121   INR  1 11             Results from last 7 days   Lab Units 06/17/21  1200 06/17/21  1121   LACTIC ACID mmol/L  --  1 1   PROCALCITONIN ng/ml 0 55*  --        Imaging: I have personally reviewed pertinent reports  Chest radiograph diminished lung volumes, elevated left diaphragm, possible left lower lobe infiltrate      EKG, Pathology, and Other Studies Reviewed on Admission:   · EKG:  EKG unable for review, reported as  sinus rhythm  · Repeat EKG a m  [facility] Reviewed: Yes     ** Please Note: This note has been constructed using a voice recognition system   **

## 2021-06-17 NOTE — ASSESSMENT & PLAN NOTE
Presents with fever cough not feeling well  x-ray suggestive of possible left-sided pneumonia  COVID negative  IV Unasyn  Aspiration precautions  Will request Speech therapy inputs for swallow evaluation  Monitor counts, temperatures, procalcitonin levels  Follow-up on cultures

## 2021-06-17 NOTE — ED NOTES
Family at bedside  Pt sitting upright on side of bed, eating lunch that he ordered       Bhakti Duncan, RN  06/17/21 6123

## 2021-06-18 LAB
ALBUMIN SERPL BCP-MCNC: 2.2 G/DL (ref 3.5–5)
ALP SERPL-CCNC: 68 U/L (ref 46–116)
ALT SERPL W P-5'-P-CCNC: 49 U/L (ref 12–78)
ANION GAP SERPL CALCULATED.3IONS-SCNC: 9 MMOL/L (ref 4–13)
AST SERPL W P-5'-P-CCNC: 68 U/L (ref 5–45)
ATRIAL RATE: 81 BPM
ATRIAL RATE: 86 BPM
BASOPHILS # BLD AUTO: 0.02 THOUSANDS/ÂΜL (ref 0–0.1)
BASOPHILS NFR BLD AUTO: 0 % (ref 0–1)
BILIRUB SERPL-MCNC: 0.49 MG/DL (ref 0.2–1)
BUN SERPL-MCNC: 10 MG/DL (ref 5–25)
CALCIUM ALBUM COR SERPL-MCNC: 9.2 MG/DL (ref 8.3–10.1)
CALCIUM SERPL-MCNC: 7.8 MG/DL (ref 8.3–10.1)
CHLORIDE SERPL-SCNC: 106 MMOL/L (ref 100–108)
CO2 SERPL-SCNC: 28 MMOL/L (ref 21–32)
CREAT SERPL-MCNC: 0.66 MG/DL (ref 0.6–1.3)
EOSINOPHIL # BLD AUTO: 0.06 THOUSAND/ÂΜL (ref 0–0.61)
EOSINOPHIL NFR BLD AUTO: 1 % (ref 0–6)
ERYTHROCYTE [DISTWIDTH] IN BLOOD BY AUTOMATED COUNT: 12.9 % (ref 11.6–15.1)
GFR SERPL CREATININE-BSD FRML MDRD: 86 ML/MIN/1.73SQ M
GLUCOSE SERPL-MCNC: 92 MG/DL (ref 65–140)
HCT VFR BLD AUTO: 33.7 % (ref 36.5–49.3)
HGB BLD-MCNC: 11.2 G/DL (ref 12–17)
IMM GRANULOCYTES # BLD AUTO: 0.03 THOUSAND/UL (ref 0–0.2)
IMM GRANULOCYTES NFR BLD AUTO: 1 % (ref 0–2)
LYMPHOCYTES # BLD AUTO: 0.72 THOUSANDS/ÂΜL (ref 0.6–4.47)
LYMPHOCYTES NFR BLD AUTO: 11 % (ref 14–44)
MAGNESIUM SERPL-MCNC: 1.8 MG/DL (ref 1.6–2.6)
MCH RBC QN AUTO: 30.4 PG (ref 26.8–34.3)
MCHC RBC AUTO-ENTMCNC: 33.2 G/DL (ref 31.4–37.4)
MCV RBC AUTO: 92 FL (ref 82–98)
MONOCYTES # BLD AUTO: 0.7 THOUSAND/ÂΜL (ref 0.17–1.22)
MONOCYTES NFR BLD AUTO: 11 % (ref 4–12)
NEUTROPHILS # BLD AUTO: 4.87 THOUSANDS/ÂΜL (ref 1.85–7.62)
NEUTS SEG NFR BLD AUTO: 76 % (ref 43–75)
NRBC BLD AUTO-RTO: 0 /100 WBCS
P AXIS: 39 DEGREES
P AXIS: 59 DEGREES
PLATELET # BLD AUTO: 148 THOUSANDS/UL (ref 149–390)
PMV BLD AUTO: 9.6 FL (ref 8.9–12.7)
POTASSIUM SERPL-SCNC: 3.2 MMOL/L (ref 3.5–5.3)
PR INTERVAL: 124 MS
PR INTERVAL: 126 MS
PROCALCITONIN SERPL-MCNC: 0.31 NG/ML
PROT SERPL-MCNC: 6 G/DL (ref 6.4–8.2)
QRS AXIS: 37 DEGREES
QRS AXIS: 46 DEGREES
QRSD INTERVAL: 86 MS
QRSD INTERVAL: 90 MS
QT INTERVAL: 344 MS
QT INTERVAL: 378 MS
QTC INTERVAL: 411 MS
QTC INTERVAL: 439 MS
RBC # BLD AUTO: 3.68 MILLION/UL (ref 3.88–5.62)
SODIUM SERPL-SCNC: 143 MMOL/L (ref 136–145)
T WAVE AXIS: 37 DEGREES
T WAVE AXIS: 50 DEGREES
VENTRICULAR RATE: 81 BPM
VENTRICULAR RATE: 86 BPM
WBC # BLD AUTO: 6.4 THOUSAND/UL (ref 4.31–10.16)

## 2021-06-18 PROCEDURE — 99232 SBSQ HOSP IP/OBS MODERATE 35: CPT | Performed by: HOSPITALIST

## 2021-06-18 PROCEDURE — 93005 ELECTROCARDIOGRAM TRACING: CPT

## 2021-06-18 PROCEDURE — 83735 ASSAY OF MAGNESIUM: CPT | Performed by: INTERNAL MEDICINE

## 2021-06-18 PROCEDURE — 93010 ELECTROCARDIOGRAM REPORT: CPT | Performed by: INTERNAL MEDICINE

## 2021-06-18 PROCEDURE — 84145 PROCALCITONIN (PCT): CPT | Performed by: EMERGENCY MEDICINE

## 2021-06-18 PROCEDURE — 85025 COMPLETE CBC W/AUTO DIFF WBC: CPT | Performed by: INTERNAL MEDICINE

## 2021-06-18 PROCEDURE — 94664 DEMO&/EVAL PT USE INHALER: CPT

## 2021-06-18 PROCEDURE — 92610 EVALUATE SWALLOWING FUNCTION: CPT

## 2021-06-18 PROCEDURE — 80053 COMPREHEN METABOLIC PANEL: CPT | Performed by: INTERNAL MEDICINE

## 2021-06-18 PROCEDURE — 94760 N-INVAS EAR/PLS OXIMETRY 1: CPT

## 2021-06-18 RX ORDER — POTASSIUM CHLORIDE 20 MEQ/1
20 TABLET, EXTENDED RELEASE ORAL ONCE
Status: COMPLETED | OUTPATIENT
Start: 2021-06-18 | End: 2021-06-18

## 2021-06-18 RX ORDER — POTASSIUM CHLORIDE 14.9 MG/ML
20 INJECTION INTRAVENOUS 2 TIMES DAILY
Status: DISCONTINUED | OUTPATIENT
Start: 2021-06-18 | End: 2021-06-18

## 2021-06-18 RX ORDER — IPRATROPIUM BROMIDE AND ALBUTEROL SULFATE 2.5; .5 MG/3ML; MG/3ML
3 SOLUTION RESPIRATORY (INHALATION) EVERY 4 HOURS PRN
Status: DISCONTINUED | OUTPATIENT
Start: 2021-06-18 | End: 2021-06-20 | Stop reason: HOSPADM

## 2021-06-18 RX ADMIN — SODIUM CHLORIDE 3 G: 9 INJECTION, SOLUTION INTRAVENOUS at 15:47

## 2021-06-18 RX ADMIN — ENOXAPARIN SODIUM 40 MG: 40 INJECTION SUBCUTANEOUS at 09:11

## 2021-06-18 RX ADMIN — SERTRALINE HYDROCHLORIDE 50 MG: 50 TABLET ORAL at 09:11

## 2021-06-18 RX ADMIN — SODIUM CHLORIDE 3 G: 9 INJECTION, SOLUTION INTRAVENOUS at 22:38

## 2021-06-18 RX ADMIN — GABAPENTIN 600 MG: 300 CAPSULE ORAL at 22:38

## 2021-06-18 RX ADMIN — POTASSIUM CHLORIDE 20 MEQ: 1500 TABLET, EXTENDED RELEASE ORAL at 13:09

## 2021-06-18 RX ADMIN — GABAPENTIN 300 MG: 300 CAPSULE ORAL at 09:10

## 2021-06-18 RX ADMIN — DOXAZOSIN 4 MG: 4 TABLET ORAL at 09:10

## 2021-06-18 RX ADMIN — ATORVASTATIN CALCIUM 10 MG: 10 TABLET, FILM COATED ORAL at 09:11

## 2021-06-18 RX ADMIN — ASPIRIN 81 MG: 81 TABLET, CHEWABLE ORAL at 09:10

## 2021-06-18 RX ADMIN — SODIUM CHLORIDE 100 ML/HR: 0.9 INJECTION, SOLUTION INTRAVENOUS at 02:54

## 2021-06-18 RX ADMIN — SODIUM CHLORIDE 3 G: 9 INJECTION, SOLUTION INTRAVENOUS at 09:11

## 2021-06-18 RX ADMIN — POTASSIUM CHLORIDE 20 MEQ: 14.9 INJECTION, SOLUTION INTRAVENOUS at 11:22

## 2021-06-18 RX ADMIN — SODIUM CHLORIDE 3 G: 9 INJECTION, SOLUTION INTRAVENOUS at 02:53

## 2021-06-18 RX ADMIN — ACETAMINOPHEN 650 MG: 325 TABLET, FILM COATED ORAL at 15:47

## 2021-06-18 RX ADMIN — LORAZEPAM 1 MG: 1 TABLET ORAL at 22:38

## 2021-06-18 NOTE — PROGRESS NOTES
[facility]  Progress Note - Abhay Barton 1933, 80 y o  male MRN: [id number]  Unit/Bed#: S -01 Encounter: [id number]  Primary Care Provider: Carrillo Urbano MD   Date and time admitted to hospital: 6/17/2021 10:21 AM    Weakness  Assessment & Plan  · Multifactorial deconditioning contributing  · Continue supportive care  · Physical therapy/ OT evaluation pending    Diarrhea  Assessment & Plan  · Likely related to febrile illness  · Check stool studies  · Check stool C diff PCR  · Supportive care    * Aspiration pneumonia ([address] )  Assessment & Plan  · Presents with fever cough not feeling well  · x-ray suggestive of possible left-sided pneumonia  · Procalcitonin 0 5 --> 0 3  · COVID negative  · Continue IV Unasyn  · Aspiration precautions  · Pending speech evaluation for swallowing  · Monitor counts, temperatures  · Follow-up on cultures  · Legionella, strep negative    Anxiety  Assessment & Plan  · Patient was reassured  · Continue Zoloft    Hypertension  Assessment & Plan  Continue doxazosin  Monitor blood pressures  Avoid hypotension    Dyslipidemia  Assessment & Plan  · Continue statin    Shortness of breath  Assessment & Plan  · Reports shortness of breath multifactorial  · Anxiety contributing  · Monitor closely    Dysphagia  Assessment & Plan  · Reports dysphagia  · Outpatient ENT input noted  · Will request speech therapy input  · Aspiration precautions    Ambulatory dysfunction  Assessment & Plan  · Patient usually ambulates with a cane presently with generalized weakness ambulating with walker  · Reports feeling generalized weak  · Safe ambulation  · Fall precautions  · Physical therapy/ occupational therapy evaluation      VTE Pharmacologic Prophylaxis:   Pharmacologic: Enoxaparin (Lovenox)  Mechanical VTE Prophylaxis in Place: Yes    Discussions with Specialists or Other Care Team Provider:  None    Education and Discussions with Family / Patient:  Patient, Hemalatha palmer family    Current Length of Stay: 1 day(s)    Current Patient Status: Inpatient     Discharge Plan / Estimated Discharge Date:  Pending clinical course    Code Status: Level 3 - DNAR and DNI      Subjective:    Patient feels mildly short of breath  Otherwise, no subjective complaints this morning  Appears tachypneic  Objective:     Vitals:   Temp (24hrs), Av 4 °F (37 4 °C), Min:98 2 °F (36 8 °C), Max:102 6 °F (39 2 °C)    Temp:  [98 2 °F (36 8 °C)-102 6 °F (39 2 °C)] 98 6 °F (37 °C)  HR:  [] 71  Resp:  [17-30] 17  BP: (133-163)/(61-77) 163/77  SpO2:  [94 %-98 %] 98 %  Body mass index is 26 87 kg/m²  Input and Output Summary (last 24 hours): Intake/Output Summary (Last 24 hours) at 2021 1204  Last data filed at 2021 0500  Gross per 24 hour   Intake 350 ml   Output 1225 ml   Net -875 ml       Physical Exam:     Physical Exam  Constitutional:       Appearance: Normal appearance  Cardiovascular:      Rate and Rhythm: Normal rate and regular rhythm  Pulmonary:      Effort: Tachypnea present  Breath sounds: Decreased breath sounds present  Abdominal:      General: Bowel sounds are normal       Palpations: Abdomen is soft  Tenderness: There is no abdominal tenderness  Musculoskeletal:      Right lower leg: No edema  Left lower leg: No edema  Skin:     General: Skin is warm and dry  Neurological:      Mental Status: He is alert and oriented to person, place, and time     Psychiatric:         Mood and Affect: Mood normal          Behavior: Behavior normal          Additional Data:     Labs:    Results from last 7 days   Lab Units 21  0527   WBC Thousand/uL 6 40   HEMOGLOBIN g/dL 11 2*   HEMATOCRIT % 33 7*   PLATELETS Thousands/uL 148*   NEUTROS PCT % 76*   LYMPHS PCT % 11*   MONOS PCT % 11   EOS PCT % 1     Results from last 7 days   Lab Units 21  0527   POTASSIUM mmol/L 3 2*   CHLORIDE mmol/L 106   CO2 mmol/L 28   BUN mg/dL 10   CREATININE mg/dL 0 66 CALCIUM mg/dL 7 8*   ALK PHOS U/L 68   ALT U/L 49   AST U/L 68*     Results from last 7 days   Lab Units 06/17/21  1121   INR  1 11       * I Have Reviewed All Lab Data Listed Above  * Additional Pertinent Lab Tests Reviewed: All Labs Within Last 24 Hours Reviewed    Imaging:    Imaging Reports Reviewed Today Include:  Chest x-ray  Imaging Personally Reviewed by Myself Includes:  None    Recent Cultures (last 7 days):     Results from last 7 days   Lab Units 06/17/21  1725 06/17/21  1140 06/17/21  1121 06/14/21  1048   BLOOD CULTURE   --  Received in Microbiology Lab  Culture in Progress  Received in Microbiology Lab  Culture in Progress    --    URINE CULTURE   --   --   --  No Growth <1000 cfu/mL   LEGIONELLA URINARY ANTIGEN  Negative  --   --   --        Last 24 Hours Medication List:   Current Facility-Administered Medications   Medication Dose Route Frequency Provider Last Rate    acetaminophen  650 mg Oral Q6H PRN Adeline Fonseca MD      aluminum-magnesium hydroxide-simethicone  30 mL Oral Q6H PRN Adeline Fonseca MD      ampicillin-sulbactam  3 g Intravenous Q6H Adeline Fonseca MD 3 g (06/18/21 0911)    aspirin  81 mg Oral Daily Adeline Fonseca MD      atorvastatin  10 mg Oral Daily Adeline Fonseca MD      doxazosin  4 mg Oral Daily Adeline Fonseca MD      enoxaparin  40 mg Subcutaneous Daily Adeline Fonseca MD      gabapentin  300 mg Oral Daily Adeline Fonseca MD      gabapentin  600 mg Oral HS Adeline Fonseca MD      ipratropium-albuterol  3 mL Nebulization Q4H PRN Adeline Fonseca MD      LORazepam  1 mg Oral HS PRN Chiquita Heath PA-C      ondansetron  4 mg Intravenous Q6H PRN Adeline Fonseca MD      potassium chloride  20 mEq Intravenous BID Theone Gio, DO 20 mEq (06/18/21 1122)    sertraline  50 mg Oral Daily Adeline Fonseca MD      sodium chloride  100 mL/hr Intravenous Continuous Adeline Fonseca MD 100 mL/hr (06/18/21 0254)        Today, Patient Was Seen By: Amarilys Alejandro DO    ** Please Note: This note has been constructed using a voice recognition system   **

## 2021-06-18 NOTE — SPEECH THERAPY NOTE
Speech Language/Pathology  Speech/Language Pathology  Assessment    Patient Name: Jennifer Carrillo  GKVBT'E Date: 6/18/2021     Problem List  Principal Problem:    Aspiration pneumonia ([address] )  Active Problems:    Weakness    Ambulatory dysfunction    Dysphagia    Shortness of breath    Dyslipidemia    Hypertension    Anxiety    Diarrhea    Past Medical History  Past Medical History:   Diagnosis Date    Coronary artery disease     Hyperlipidemia      Past Surgical History  Past Surgical History:   Procedure Laterality Date    APPENDECTOMY      TOTAL HIP ARTHROPLASTY         History of Present Illness per chart:  Jennifer Carrillo is a 80 y o  male who presents with not feeling well, fever nausea  Patient was evaluated for these symptoms at an urgent care center couple of days back and placed on ciprofloxacin  Daughter at bedside reports he has taken so far 3 tablets of ciprofloxacin  He continues to feel weak fatigued with episodes of diarrhea hence he presents to the hospital   He noticed fever couple of days back, he has history of dysphagia and reports he chokes on food  No history of cough  Reports feeling short of breath  On inquiry he does report feeling anxious at the moment      Reports watery diarrhea multiple episodes since today  No history of prior antibiotic use prior to 3 dose of ciprofloxacin  No history of C diff in the past   No history of hematochezia hematemesis  Denies abdominal pain  His appetite is fair to good      Usually ambulates with a cane however for the last few days since the acute febrile illness he reports he is feeling very weak and needs walker to ambulate       He reports increased urinary frequency however denies history of dysuria hematuria flank pain  Special Studies per chart:   XR chest per chart:   IMPRESSION:  Elevated left diaphragm with increased left basilar opacity suggesting atelectasis or possibly infiltrate  Correlate clinically           Bedside Swallow Evaluation:    Summary:  Pt presents w/ mild oropharyngeal dysphagia  Pt reports a h/o of pharyngeal dysphagia/c/o of throat pain  Went to see an ENT and pt reported that everything was functional  Pt was consuming his breakfast tray upon arrival of the SLP  Pt had cream of wheat with thin milk, toast, tami cracker, and nectar thick liquid  Pt noted to have functional manipulation and control with the cream of wheat, but noted to have coughing after bites of the cream of wheat with thin liquid milk  Pt with timely mastication and manipulation of the tami cracker, but mild-moderately prolonged with the toast  Pt noted to have s/s of aspiration with the thin liquids and increased RR  Trialed nectar thick liquids  Pt took consecutive sips without s/s of aspiration  Recommendations:  Diet: Dysphagia level 3- soft to chew/dental soft  Liquid: Nectar thick liquids   Meds: as tolerated   Supervision: Intermittent   Positioning:Upright  Strategies: Pt to take PO/Meds only when fully alert and upright  Slow rate, small bites/sips, alternate between liquids and solids    Oral care: To be completed daily   Aspiration precautions  Reflux precautions    Therapy Prognosis: Good   Prognosis considerations: Medical status, family support, cognitive status   Frequency:  F/u 2-4x as able  Goal(s):  Pt will tolerate least restrictive diet w/out s/s aspiration or oral/pharyngeal difficulties  Consider consult w/:  Nutrition    Reason for consult:  R/o aspiration  Determine safest and least restrictive diet  h/o dysphagia       Precautions:  Contact and hand hygiene (r/o C diff)    Current diet:  Regular and thin liquids     Premorbid diet[de-identified]  Regular and thin liquids     Previous VBS:  No h/o of VBS, but patient reported that he saw an ENT due to c/o of pharyngeal dysphagia  O2 requirement:  2L NC    Voice/Speech:  Clear and intelligible, increased RR while talking     Social:  Lives at home with his wife  Follows commands:  WFL                         Cognitive Status:  WFL     Oral [facility] exam:  Dentition: missing teeth, does not wear dentures     Labial strength and ROM: WFL  Lingual strength and ROM: WFL  Mandibular strength and ROM: adequate   Velum: unable to visualize   Secretion management: WFL  Volitional cough: Strong   Volitional swallow: WFL   Oral care     Items administered:  Puree, soft solid, hard solid, nectar thick liquid, thin liquids,liquids were taken by straw    Oral stage:  Mild   Lip closure:WFL  Mastication: mild to moderately prolonged with regular solids  Bolus formation: mildly reduced   Bolus control: good   Transfer: timely   Oral residue: not noted   Pocketing: no     Pharyngeal stage:  Mild   Swallow promptness: Fairly prompt   Laryngeal rise: palpated and judged to be [facility]  Wet voice: no   Throat clear: no   Cough: yes with thin liquids   Secondary swallows: no   Audible swallows: no       Esophageal stage:  No s/s reported    Aspiration precautions posted    Results d/w:  Pt, nursing

## 2021-06-18 NOTE — QUICK NOTE
Spoke to family in room regarding plan of care for the day  Discussed that given his history of dysphagia, even with a modified diet, patient may have risk of aspiration in the future  Patient and his family all agreed that he would not want to pursue PEG tube in the future, understanding that aspiration pneumonia is a risk moving forward

## 2021-06-18 NOTE — ASSESSMENT & PLAN NOTE
· Likely related to febrile illness  · Check stool studies  · Check stool C diff PCR  · Supportive care

## 2021-06-18 NOTE — UTILIZATION REVIEW
Initial Clinical Review    Admission: Date/Time/Statement:   Admission Orders (From admission, onward)     Ordered        06/17/21 1408  Inpatient Admission  Once                   Orders Placed This Encounter   Procedures    Inpatient Admission     Standing Status:   Standing     Number of Occurrences:   1     Order Specific Question:   Level of Care     Answer:   Med Surg [16]     Order Specific Question:   Estimated length of stay     Answer:   More than 2 Midnights     Order Specific Question:   Certification     Answer:   I certify that inpatient services are medically necessary for this patient for a duration of greater than two midnights  See H&P and MD Progress Notes for additional information about the patient's course of treatment  ED Arrival Information     Expected Arrival Acuity    - 6/17/2021 10:21 Emergent         Means of arrival Escorted by Service Admission type    Ambulance [facility] Emergency         Arrival complaint    Dizziness/Nausea        Chief Complaint   Patient presents with    Weakness - Generalized     Patient coming from home via EMS for c/o weakness that has been increasing over the past two weeks  EMS reports patient was incontinent of urine and experiencing urinary frequency  Denies vomiting, reports nausea and dizzness  Denies pain  Initial Presentation: 81 y/o male presented to [facility] ED via EMS due to generalized weakness, fever, cough with SOB and urinary incontinence for 4 days  COVID test negative at urgent care  Also had 2 falls without injury this am due to weakness and unsteady on his feet  Decrease in appetite and poor oral intake  CXR suggestive of possible left-sided pneumonia  Admit Inpatient med surg for aspiration pneumonia, IV ABX, speech therapy for swallow eval, monitor counts, temperatures, procalcitonin levels, f/up on cxs, stool study due to diarrhea, PT      Date: 6/18   Day 2:   Multifactorial deconditioning contributing to weakness, PT/OT eval pending  Check stool cxs due to diarrhea  Continue IV ABX for aspiration pneumonia, monitor counts and temp, f/up on cxs  Patient tolerating antibiotics well  Was seen evaluated by speech therapy recommended modified diet with thickened liquids  Patient and family did not feel that he will be able to continue on the thickened liquids as he was already having trouble staying hydrated  Explained the risks and benefits which they understand  Not interested in other means of nutrition if it comes to that         ED Triage Vitals   Temperature Pulse Respirations Blood Pressure SpO2   06/17/21 1029 06/17/21 1029 06/17/21 1029 06/17/21 1029 06/17/21 1029   100 1 °F (37 8 °C) 90 16 130/63 98 %      Temp Source Heart Rate Source Patient Position - Orthostatic VS BP Location FiO2 (%)   06/17/21 1029 06/17/21 1029 06/17/21 1029 06/17/21 1029 --   Oral Monitor Lying Right arm       Pain Score       06/17/21 1610       No Pain          Wt Readings from Last 1 Encounters:   06/17/21 71 kg (156 lb 8 4 oz)     Additional Vital Signs:   Date/Time  Temp  Pulse  Resp  BP  MAP (mmHg)  SpO2  Calculated FIO2 (%) - Nasal Cannula  Nasal Cannula O2 Flow Rate (L/min)  O2 Device   06/18/21 07:18:07  98 6 °F (37 °C)  71  --  163/77  106  98 %  --  --  --   06/18/21 07:15:55  98 6 °F (37 °C)  69  17  --  --  98 %  --  --  --   06/18/21 02:49:03  98 2 °F (36 8 °C)  72  30Abnormal   156/74  101  98 %  --  --  --   06/18/21 0244  --  --  --  --  --  97 %  28  2 L/min  Nasal cannula   06/17/21 21:47:31  98 7 °F (37 1 °C)  73  18  149/64  92  98 %  --  --  --   06/17/21 2100  99 1 °F (37 3 °C)  --  --  --  --  --  --  --  --   06/17/21 17:04:35  102 6 °F (39 2 °C)Abnormal   94  18  --  --  94 %  --  --  --   06/17/21 15:46:31  100 3 °F (37 9 °C)  100  20  133/61  85  96 %  28  2 L/min  Nasal cannula   06/17/21 1320  99 2 °F (37 3 °C)  --  --  --  --  --  --  --  --     Pertinent Labs/Diagnostic Test Results:   6/18 CXR: Elevated left diaphragm with increased left basilar opacity suggesting atelectasis or possibly infiltrate     6/18 EKG:  NSR    Results from last 7 days   Lab Units 06/17/21  1121 06/14/21  1035   SARS-COV-2  Negative Negative     Results from last 7 days   Lab Units 06/18/21  0527 06/17/21  1121   WBC Thousand/uL 6 40 7 56   HEMOGLOBIN g/dL 11 2* 12 7   HEMATOCRIT % 33 7* 37 3   PLATELETS Thousands/uL 148* 165   NEUTROS ABS Thousands/µL 4 87 6 31     Results from last 7 days   Lab Units 06/18/21  0527 06/17/21  1121   SODIUM mmol/L 143 138   POTASSIUM mmol/L 3 2* 3 6   CHLORIDE mmol/L 106 102   CO2 mmol/L 28 27   ANION GAP mmol/L 9 9   BUN mg/dL 10 16   CREATININE mg/dL 0 66 0 82   EGFR ml/min/1 73sq m 86 79   CALCIUM mg/dL 7 8* 8 5   MAGNESIUM mg/dL 1 8  --      Results from last 7 days   Lab Units 06/18/21  0527 06/17/21  1121   AST U/L 68* 67*   ALT U/L 49 40   ALK PHOS U/L 68 78   TOTAL PROTEIN g/dL 6 0* 7 1   ALBUMIN g/dL 2 2* 2 7*   TOTAL BILIRUBIN mg/dL 0 49 0 55     Results from last 7 days   Lab Units 06/18/21  0527 06/17/21  1121   GLUCOSE RANDOM mg/dL 92 128     Results from last 7 days   Lab Units 06/17/21  1121   TROPONIN I ng/mL <0 02     Results from last 7 days   Lab Units 06/17/21  1121   PROTIME seconds 14 4   INR  1 11   PTT seconds 35     Results from last 7 days   Lab Units 06/18/21  0527 06/17/21  1200   PROCALCITONIN ng/ml 0 31* 0 55*     Results from last 7 days   Lab Units 06/17/21  1121   LACTIC ACID mmol/L 1 1     Results from last 7 days   Lab Units 06/17/21  1223 06/14/21  1047   CLARITY UA  Clear clear   COLOR UA  Yellow yellow   SPEC GRAV UA  1 010  --    PH UA  6 0  --    GLUCOSE UA mg/dl Negative neg   KETONES UA mg/dl Negative neg   BLOOD UA  Small* neg   PROTEIN UA mg/dl Negative neg   NITRITE UA  Negative neg   BILIRUBIN UA  Negative  --    BILIRUBIN UA POC   --  neg   UROBILINOGEN UA E U /dl 1 0 0 2   LEUKOCYTES UA  Negative neg   WBC UA /hpf 0-1  --    RBC UA /hpf 1-2  -- BACTERIA UA /hpf Occasional  --    EPITHELIAL CELLS WET PREP /hpf Occasional  --      Results from last 7 days   Lab Units 06/17/21  1725 06/17/21  1724   STREP PNEUMONIAE ANTIGEN, URINE   --  Negative   LEGIONELLA URINARY ANTIGEN  Negative  --      Results from last 7 days   Lab Units 06/17/21  1140 06/17/21  1121 06/14/21  1048   BLOOD CULTURE  Received in Microbiology Lab  Culture in Progress  Received in Microbiology Lab  Culture in Progress    --    URINE CULTURE   --   --  No Growth <1000 cfu/mL     ED Treatment:   Medication Administration from 06/17/2021 1020 to 06/17/2021 1509       Date/Time Order Dose Route Action     06/17/2021 1120 sodium chloride 0 9 % bolus 1,000 mL 1,000 mL Intravenous New Bag     06/17/2021 1223 cefepime (MAXIPIME) 2 g/50 mL dextrose IVPB 2,000 mg Intravenous New Bag     06/17/2021 1310 vancomycin (VANCOCIN) 1,250 mg in sodium chloride 0 9 % 250 mL IVPB 1,250 mg Intravenous New Bag        Past Medical History:   Diagnosis Date    Coronary artery disease     Hyperlipidemia      Present on Admission:  **None**      Admitting Diagnosis: Dizziness [R42]  Weakness [R53 1]  Community acquired pneumonia [J18 9]  Age/Sex: 80 y o  male  Admission Orders:  Scheduled Medications:  ampicillin-sulbactam, 3 g, Intravenous, Q6H  aspirin, 81 mg, Oral, Daily  atorvastatin, 10 mg, Oral, Daily  doxazosin, 4 mg, Oral, Daily  enoxaparin, 40 mg, Subcutaneous, Daily  gabapentin, 300 mg, Oral, Daily  gabapentin, 600 mg, Oral, HS  potassium chloride, 20 mEq, Intravenous, BID  sertraline, 50 mg, Oral, Daily      Continuous IV Infusions:  sodium chloride, 100 mL/hr, Intravenous, Continuous      PRN Meds:  acetaminophen, 650 mg, Oral, Q6H PRN  aluminum-magnesium hydroxide-simethicone, 30 mL, Oral, Q6H PRN  ipratropium-albuterol, 3 mL, Nebulization, Q4H PRN  LORazepam, 1 mg, Oral, HS PRN  ondansetron, 4 mg, Intravenous, Q6H PRN      scd  Bladder scan    Network Utilization Review Department  ATTENTION: Please call with any questions or concerns to [phone] and carefully listen to the prompts so that you are directed to the right person  All voicemails are confidential   Candia Siemens all requests for admission clinical reviews, approved or denied determinations and any other requests to dedicated fax number below belonging to the campus where the patient is receiving treatment   List of dedicated fax numbers for the Facilities:  [address] DENIALS (Administrative/Medical Necessity) [phone]   [address] (Maternity/NICU/Pediatrics) [phone]   [address] Holley Vazquez 7340 [id number] [place] [address] Aura Nagy 1481 [address] [place] [address] [phone]

## 2021-06-18 NOTE — ASSESSMENT & PLAN NOTE
· Reports dysphagia  · Outpatient ENT input noted  · Will request speech therapy input  · Aspiration precautions

## 2021-06-18 NOTE — ASSESSMENT & PLAN NOTE
· Multifactorial deconditioning contributing  · Continue supportive care  · Physical therapy/ OT evaluation pending

## 2021-06-18 NOTE — ASSESSMENT & PLAN NOTE
· Patient usually ambulates with a cane presently with generalized weakness ambulating with walker  · Reports feeling generalized weak  · Safe ambulation  · Fall precautions  · Physical therapy/ occupational therapy evaluation

## 2021-06-18 NOTE — ASSESSMENT & PLAN NOTE
· Presents with fever cough not feeling well  · x-ray suggestive of possible left-sided pneumonia  · Procalcitonin 0 5 --> 0 3  · COVID negative  · Continue IV Unasyn  · Aspiration precautions  · Pending speech evaluation for swallowing  · Monitor counts, temperatures  · Follow-up on cultures  · Legionella, strep negative

## 2021-06-19 ENCOUNTER — APPOINTMENT (INPATIENT)
Dept: RADIOLOGY | Facility: HOSPITAL | Age: 86
DRG: 178 | End: 2021-06-19
Payer: COMMERCIAL

## 2021-06-19 PROBLEM — R19.7 DIARRHEA: Status: RESOLVED | Noted: 2021-06-17 | Resolved: 2021-06-19

## 2021-06-19 LAB
ANION GAP SERPL CALCULATED.3IONS-SCNC: 7 MMOL/L (ref 4–13)
BASOPHILS # BLD AUTO: 0.03 THOUSANDS/ÂΜL (ref 0–0.1)
BASOPHILS NFR BLD AUTO: 1 % (ref 0–1)
BUN SERPL-MCNC: 8 MG/DL (ref 5–25)
CALCIUM SERPL-MCNC: 8.4 MG/DL (ref 8.3–10.1)
CHLORIDE SERPL-SCNC: 105 MMOL/L (ref 100–108)
CO2 SERPL-SCNC: 31 MMOL/L (ref 21–32)
CREAT SERPL-MCNC: 0.51 MG/DL (ref 0.6–1.3)
EOSINOPHIL # BLD AUTO: 0.14 THOUSAND/ÂΜL (ref 0–0.61)
EOSINOPHIL NFR BLD AUTO: 2 % (ref 0–6)
ERYTHROCYTE [DISTWIDTH] IN BLOOD BY AUTOMATED COUNT: 12.9 % (ref 11.6–15.1)
GFR SERPL CREATININE-BSD FRML MDRD: 96 ML/MIN/1.73SQ M
GLUCOSE SERPL-MCNC: 94 MG/DL (ref 65–140)
HCT VFR BLD AUTO: 34.4 % (ref 36.5–49.3)
HGB BLD-MCNC: 11.1 G/DL (ref 12–17)
IMM GRANULOCYTES # BLD AUTO: 0.04 THOUSAND/UL (ref 0–0.2)
IMM GRANULOCYTES NFR BLD AUTO: 1 % (ref 0–2)
LYMPHOCYTES # BLD AUTO: 0.8 THOUSANDS/ÂΜL (ref 0.6–4.47)
LYMPHOCYTES NFR BLD AUTO: 13 % (ref 14–44)
MCH RBC QN AUTO: 30 PG (ref 26.8–34.3)
MCHC RBC AUTO-ENTMCNC: 32.3 G/DL (ref 31.4–37.4)
MCV RBC AUTO: 93 FL (ref 82–98)
MONOCYTES # BLD AUTO: 0.62 THOUSAND/ÂΜL (ref 0.17–1.22)
MONOCYTES NFR BLD AUTO: 10 % (ref 4–12)
NEUTROPHILS # BLD AUTO: 4.79 THOUSANDS/ÂΜL (ref 1.85–7.62)
NEUTS SEG NFR BLD AUTO: 73 % (ref 43–75)
NRBC BLD AUTO-RTO: 0 /100 WBCS
PLATELET # BLD AUTO: 163 THOUSANDS/UL (ref 149–390)
PMV BLD AUTO: 9.4 FL (ref 8.9–12.7)
POTASSIUM SERPL-SCNC: 3.4 MMOL/L (ref 3.5–5.3)
PROCALCITONIN SERPL-MCNC: 0.17 NG/ML
RBC # BLD AUTO: 3.7 MILLION/UL (ref 3.88–5.62)
SODIUM SERPL-SCNC: 143 MMOL/L (ref 136–145)
WBC # BLD AUTO: 6.42 THOUSAND/UL (ref 4.31–10.16)

## 2021-06-19 PROCEDURE — 80048 BASIC METABOLIC PNL TOTAL CA: CPT | Performed by: INTERNAL MEDICINE

## 2021-06-19 PROCEDURE — 84145 PROCALCITONIN (PCT): CPT | Performed by: INTERNAL MEDICINE

## 2021-06-19 PROCEDURE — 94640 AIRWAY INHALATION TREATMENT: CPT

## 2021-06-19 PROCEDURE — 85025 COMPLETE CBC W/AUTO DIFF WBC: CPT | Performed by: INTERNAL MEDICINE

## 2021-06-19 PROCEDURE — 97116 GAIT TRAINING THERAPY: CPT

## 2021-06-19 PROCEDURE — 71045 X-RAY EXAM CHEST 1 VIEW: CPT

## 2021-06-19 PROCEDURE — 99232 SBSQ HOSP IP/OBS MODERATE 35: CPT | Performed by: HOSPITALIST

## 2021-06-19 PROCEDURE — 97163 PT EVAL HIGH COMPLEX 45 MIN: CPT

## 2021-06-19 PROCEDURE — 94760 N-INVAS EAR/PLS OXIMETRY 1: CPT

## 2021-06-19 RX ORDER — POTASSIUM CHLORIDE 20 MEQ/1
40 TABLET, EXTENDED RELEASE ORAL ONCE
Status: COMPLETED | OUTPATIENT
Start: 2021-06-19 | End: 2021-06-19

## 2021-06-19 RX ORDER — GUAIFENESIN 600 MG/1
600 TABLET, EXTENDED RELEASE ORAL EVERY 12 HOURS SCHEDULED
Status: DISCONTINUED | OUTPATIENT
Start: 2021-06-19 | End: 2021-06-20 | Stop reason: HOSPADM

## 2021-06-19 RX ORDER — BENZONATATE 100 MG/1
100 CAPSULE ORAL 3 TIMES DAILY PRN
Status: DISCONTINUED | OUTPATIENT
Start: 2021-06-19 | End: 2021-06-20 | Stop reason: HOSPADM

## 2021-06-19 RX ADMIN — GUAIFENESIN 600 MG: 600 TABLET, EXTENDED RELEASE ORAL at 15:16

## 2021-06-19 RX ADMIN — GABAPENTIN 300 MG: 300 CAPSULE ORAL at 09:32

## 2021-06-19 RX ADMIN — IPRATROPIUM BROMIDE AND ALBUTEROL SULFATE 3 ML: 2.5; .5 SOLUTION RESPIRATORY (INHALATION) at 01:47

## 2021-06-19 RX ADMIN — SERTRALINE HYDROCHLORIDE 50 MG: 50 TABLET ORAL at 09:32

## 2021-06-19 RX ADMIN — LORAZEPAM 1 MG: 1 TABLET ORAL at 21:10

## 2021-06-19 RX ADMIN — POTASSIUM CHLORIDE 40 MEQ: 1500 TABLET, EXTENDED RELEASE ORAL at 09:30

## 2021-06-19 RX ADMIN — ASPIRIN 81 MG: 81 TABLET, CHEWABLE ORAL at 09:33

## 2021-06-19 RX ADMIN — SODIUM CHLORIDE 3 G: 9 INJECTION, SOLUTION INTRAVENOUS at 21:10

## 2021-06-19 RX ADMIN — GABAPENTIN 600 MG: 300 CAPSULE ORAL at 21:10

## 2021-06-19 RX ADMIN — SODIUM CHLORIDE 3 G: 9 INJECTION, SOLUTION INTRAVENOUS at 09:30

## 2021-06-19 RX ADMIN — ATORVASTATIN CALCIUM 10 MG: 10 TABLET, FILM COATED ORAL at 09:32

## 2021-06-19 RX ADMIN — SODIUM CHLORIDE 3 G: 9 INJECTION, SOLUTION INTRAVENOUS at 15:16

## 2021-06-19 RX ADMIN — ACETAMINOPHEN 650 MG: 325 TABLET, FILM COATED ORAL at 16:36

## 2021-06-19 RX ADMIN — SODIUM CHLORIDE 3 G: 9 INJECTION, SOLUTION INTRAVENOUS at 03:07

## 2021-06-19 RX ADMIN — ENOXAPARIN SODIUM 40 MG: 40 INJECTION SUBCUTANEOUS at 09:33

## 2021-06-19 RX ADMIN — DOXAZOSIN 4 MG: 4 TABLET ORAL at 09:32

## 2021-06-19 NOTE — ASSESSMENT & PLAN NOTE
Patient usually ambulates with a cane presently with generalized weakness ambulating with walker  · Reports feeling generalized weak  · Safe ambulation  · Fall precautions  · Physical therapy/ occupational therapy evaluation

## 2021-06-19 NOTE — PLAN OF CARE
Problem: PHYSICAL THERAPY ADULT  Goal: Performs mobility at highest level of function for planned discharge setting  See evaluation for individualized goals  Description: Treatment/Interventions: Functional transfer training, LE strengthening/ROM, Therapeutic exercise, Endurance training, Elevations, Patient/family training, Equipment eval/education, Bed mobility, Gait training          See flowsheet documentation for full assessment, interventions and recommendations  6/19/2021 1424 by Carina Michael PT  Outcome: Progressing  Note: Prognosis: Fair  Problem List: Decreased strength, Decreased endurance, Impaired balance, Decreased mobility, Decreased safety awareness, Impaired sensation, Pain  Assessment: Pt presents with not feeling well, fever, and nausea  Dx: aspiration pneuonia, dysphagia, ambulatory dysfunction, and diarrhea  order placed for PT eval and tx, w/ activity order of up w/ A  pt presents w/ comorbidities of CAD, hyperlipidemia, and NOVA and personal factors of advanced age, mobilizing w/ assistive device, stair(s) to enter home and positive fall history  pt presents w/ pain, weakness, decreased endurance, impaired balance, gait deviations, altered sensation, decreased safety awareness and fall risk  these impairments are evident in findings from physical examination (weakness and altered sensation), mobility assessment (need for min to mod assist w/ all phases of mobility when usually mobilizing independently, tolerance to only 6 feet of ambulation and need for cueing for mobility technique), and Barthel Index: 50/100 and 30 second chair stand test: 0 (less than 8 indicates fall risk in males 80to 80years old)  pt needed input for mobility and breathing technique  pt is at risk for falls due to physical and safety awareness deficits   pt's clinical presentation is unstable/unpredictable (evident in need for assist w/ all phases of mobility when usually mobilizing independently, tolerance to only 30 feet of ambulation, need for supplemental oxygen in order to maintain oxygen saturation and need for input for mobility technique/safety)  pt needs inpatient PT tx to improve mobility deficits and progress mobility training as appropriate  discharge recommendation is for inpatient rehab to reduce fall risk and maximize level of functional independence  PT Discharge Recommendation: Post acute rehabilitation services          See flowsheet documentation for full assessment  6/19/2021 1423 by Addie Robison PT  Outcome: Progressing  Note: Prognosis: Fair  Problem List: Decreased strength, Decreased endurance, Impaired balance, Decreased mobility, Decreased safety awareness, Impaired sensation, Pain  Assessment: Pt presents with not feeling well, fever, and nausea  Dx: aspiration pneuonia, dysphagia, ambulatory dysfunction, and diarrhea  order placed for PT eval and tx, w/ activity order of up w/ A  pt presents w/ comorbidities of CAD, hyperlipidemia, and NVOA and personal factors of advanced age, mobilizing w/ assistive device, stair(s) to enter home and positive fall history  pt presents w/ pain, weakness, decreased endurance, impaired balance, gait deviations, altered sensation, decreased safety awareness and fall risk  these impairments are evident in findings from physical examination (weakness and altered sensation), mobility assessment (need for min to mod assist w/ all phases of mobility when usually mobilizing independently, tolerance to only 6 feet of ambulation and need for cueing for mobility technique), and Barthel Index: 50/100 and 30 second chair stand test: 0 (less than 8 indicates fall risk in males 80to 80years old)  pt needed input for mobility and breathing technique  pt is at risk for falls due to physical and safety awareness deficits   pt's clinical presentation is unstable/unpredictable (evident in need for assist w/ all phases of mobility when usually mobilizing independently, tolerance to only 30 feet of ambulation, need for supplemental oxygen in order to maintain oxygen saturation and need for input for mobility technique/safety)  pt needs inpatient PT tx to improve mobility deficits and progress mobility training as appropriate  discharge recommendation is for inpatient rehab to reduce fall risk and maximize level of functional independence  PT Discharge Recommendation: Post acute rehabilitation services          See flowsheet documentation for full assessment

## 2021-06-19 NOTE — PHYSICAL THERAPY NOTE
PHYSICAL THERAPY TREATMENT NOTE    Patient Name: Sheldon Jensen  GTVLN'S Date: 6/19/2021 06/19/21 1359   PT Last Visit   PT Visit Date 06/19/21   Pain Assessment   Pain Assessment Tool 0-10   Pain Score 2   Pain Location/Orientation Location: Head   Restrictions/Precautions   Other Precautions Chair Alarm; Bed Alarm;O2;Fall Risk;Pain;Aspiration  (Masimo)   General   Chart Reviewed Yes   Family/Caregiver Present Yes  (pt's family entered during session )   Cognition   Arousal/Participation Alert; Cooperative   Attention Within functional limits   Orientation Level Oriented to person; Other (Comment)  (pt was identified w/ full name, birth date)   Following Commands Follows one step commands without difficulty   Subjective   Subjective pt agreed to participate in PT intervention  Transfers   Sit to Stand 4  Minimal assistance   Additional items Assist x 1; Increased time required;Verbal cues  (for hand placement, LE positioning)   Stand to Sit 4  Minimal assistance   Additional items Assist x 1; Increased time required;Verbal cues  (for body positioning, hand placement)   Ambulation/Elevation   Gait pattern Excessively slow; Inconsistent moo; Foward flexed   Gait Assistance 4  Minimal assist   Additional items Assist x 1;Verbal cues  (for walker placement, breathing technique)   Assistive Device Rolling walker   Distance 20 feet x2 w/ standing rest break x 2 minutes  (additional not possible due to fatigue)   Stair Management Assistance Not tested   Balance   Static Sitting Fair   Static Standing Poor +  (w/ roller walker)   Ambulatory Poor +  (w/ roller walker)   Activity Tolerance   Activity Tolerance Patient limited by fatigue   Nurse Made Aware spoke to Dr Christa Bergeron   Assessment   Prognosis Fair   Problem List Decreased strength;Decreased endurance; Impaired balance;Decreased mobility; Decreased safety awareness; Impaired sensation;Pain   Assessment Therapist introduced roller walker use w/ mobility to address impairments noted during evaluation  Pt was noted to have improvement in mobility status w/ use of roller walker w/ increased ambulation tolerance and decreased level of assist to mobilize safely  Pt continues to require min assist and verbal cues w/ mobility for proper technique/safety  Pt is at risk for falling  continued inpatient PT tx is indicated to reduce fall risk factors  Goals   Patient Goals to be able to do things for myself  STG Expiration Date 06/29/21   Short Term Goal #1 pt will: Increase bilateral LE strength 1/2 grade to facilitate independent mobility, Perform all bed mobility tasks modified independent to decrease fall risk factors, Perform all transfers w/ supervision to improve independence, Ambulate 150 ft  with least restrictive assistive device w/ supervision w/o LOB to expedite return to independent level of function, Navigate 1 stair w/ minx1 with unilateral handrail to facilitate return to previous living environment, Increase ambulatory balance 1 grade to decrease risk for falls, Complete exercise program independently to increase strength and endurance, Tolerate 3 hr OOB to faciliate upright tolerance, Improve Barthel Index score to 75 or greater to facilitate independence and Increase 30 second chair stand test score to 4 or greater to decrease risk for falls   PT Treatment Day 1   Plan   Treatment/Interventions Functional transfer training;LE strengthening/ROM; Therapeutic exercise; Endurance training;Elevations; Patient/family training;Equipment eval/education; Bed mobility;Gait training   Progress Progressing toward goals   PT Frequency 5x/wk   Recommendation   PT Discharge Recommendation Post acute rehabilitation services   Additional Comments recommend roller walker use w/ mobility   AM-PAC Basic Mobility Inpatient   Turning in Bed Without Bedrails 4   Lying on Back to Sitting on Edge of Flat Bed 3   Moving Bed to Chair 3   Standing Up From Chair 3   Walk in Room 3   Climb 3-5 Stairs 1   Basic Mobility Inpatient Raw Score 17   Basic Mobility Standardized Score 39 67     Skilled inpatient PT recommended while in hospital to progress pt toward treatment goals      Maggie Lugo, PT

## 2021-06-19 NOTE — ASSESSMENT & PLAN NOTE
Presents with fever cough not feeling well  x-ray suggestive of possible left-sided pneumonia  Procalcitonin 0 5 --> 0 3, COVID negative  Legionella, strep negative   Blood cultures -24 hours    Plan  · Repeat chest x-ray was personally reviewed left lower lobe pneumonia  · Continue IV Unasyn day 2  · Continue to wean oxygen was weaned from 3 L to 2 L today  · Aspiration precautions  · Appreciate speech eval recommendation:Dysphagia level 3- soft to chew/dental soft,  Nectar thick liquids, Pt to take PO/Meds only when fully alert and upright   Slow rate, small bites/sips, alternate between liquids and solids  · Discussed with the patient speech eval results, patient is not agreeable with thickened liquids would like to stick with thin liquids, patient understand risks of recurrent aspiration pneumonia, patient understand this risk  · Patient does not want any artificial feeding  · Monitor counts, temperatures

## 2021-06-19 NOTE — ASSESSMENT & PLAN NOTE
· Reports dysphagia  · Outpatient ENT input noted  · Aspiration precautions    Plan:  See plan above

## 2021-06-19 NOTE — PROGRESS NOTES
[facility]  Progress Note - Patrick Collins 1933, 80 y o  male MRN: [id number]  Unit/Bed#: S -01 Encounter: [id number]  Primary Care Provider: Sanchez Hernandez MD   Date and time admitted to hospital: 6/17/2021 10:21 AM    * Aspiration pneumonia [facility])  Assessment & Plan  Presents with fever cough not feeling well  x-ray suggestive of possible left-sided pneumonia  Procalcitonin 0 5 --> 0 3, COVID negative  Legionella, strep negative   Blood cultures -24 hours    Plan  · Repeat chest x-ray was personally reviewed left lower lobe pneumonia  · Continue IV Unasyn day 2  · Continue to wean oxygen was weaned from 3 L to 2 L today  · Aspiration precautions  · Appreciate speech eval recommendation:Dysphagia level 3- soft to chew/dental soft,  Nectar thick liquids, Pt to take PO/Meds only when fully alert and upright   Slow rate, small bites/sips, alternate between liquids and solids  · Discussed with the patient speech eval results, patient is not agreeable with thickened liquids would like to stick with thin liquids, patient understand risks of recurrent aspiration pneumonia, patient understand this risk  · Patient does not want any artificial feeding  · Monitor counts, temperatures        Dysphagia  Assessment & Plan  · Reports dysphagia  · Outpatient ENT input noted  · Aspiration precautions    Plan:  See plan above    Anxiety  Assessment & Plan  · Patient was reassured  · Continue Zoloft    Hypertension  Assessment & Plan  Continue doxazosin  Monitor blood pressures  Avoid hypotension    Dyslipidemia  Assessment & Plan  · Continue statin    Ambulatory dysfunction  Assessment & Plan  Patient usually ambulates with a cane presently with generalized weakness ambulating with walker  · Reports feeling generalized weak  · Safe ambulation  · Fall precautions  · Physical therapy/ occupational therapy evaluation    Diarrhea-resolved as of 6/19/2021  Assessment & Plan  · Discontinued C diff and stool enteric panel patient reports no longer having diarrhea      VTE Pharmacologic Prophylaxis:   Pharmacologic: Enoxaparin (Lovenox)  Mechanical VTE Prophylaxis in Place: Yes    Discussions with Specialists or Other Care Team Provider:  Discussed with my attending current plan    Education and Discussions with Family / Patient:  Discussed with the plan at bedside, will call family member for update in afternoon    Current Length of Stay: 2 day(s)    Current Patient Status: Inpatient     Discharge Plan / Estimated Discharge Date:  Likely Monday    Code Status: Level 3 - DNAR and DNI      Subjective:   Patient reported improvement of breathing, has no subjective complain, and patient is not agreeable with thickened liquids and would like to be on thin liquids instead    Objective:     Vitals:   Temp (24hrs), Av 5 °F (36 9 °C), Min:98 4 °F (36 9 °C), Max:98 6 °F (37 °C)    Temp:  [98 4 °F (36 9 °C)-98 6 °F (37 °C)] 98 6 °F (37 °C)  HR:  [71-95] 71  Resp:  [14-34] 14  BP: (145-161)/(71-77) 154/73  SpO2:  [94 %-99 %] 99 %  Body mass index is 26 87 kg/m²  Input and Output Summary (last 24 hours): Intake/Output Summary (Last 24 hours) at 2021 1222  Last data filed at 2021 0901  Gross per 24 hour   Intake 120 ml   Output 1100 ml   Net -980 ml       Physical Exam:     Physical Exam  Constitutional:       Appearance: Normal appearance  HENT:      Head: Normocephalic and atraumatic  Nose: Nose normal       Mouth/Throat:      Mouth: Mucous membranes are moist    Eyes:      Extraocular Movements: Extraocular movements intact  Conjunctiva/sclera: Conjunctivae normal       Pupils: Pupils are equal, round, and reactive to light  Cardiovascular:      Rate and Rhythm: Normal rate and regular rhythm  Pulses: Normal pulses  Heart sounds: Normal heart sounds  Pulmonary:      Effort: Pulmonary effort is normal  Tachypnea present        Comments: Crackles on the left side, mostly clear lung sounds  Abdominal:      General: Abdomen is flat  Bowel sounds are normal       Palpations: Abdomen is soft  Tenderness: There is no abdominal tenderness  Musculoskeletal:         General: Normal range of motion  Right lower leg: No edema  Left lower leg: No edema  Skin:     General: Skin is warm and dry  Capillary Refill: Capillary refill takes less than 2 seconds  Neurological:      General: No focal deficit present  Mental Status: He is alert and oriented to person, place, and time  Mental status is at baseline  Psychiatric:         Mood and Affect: Mood normal          Behavior: Behavior normal              Additional Data:     Labs:    Results from last 7 days   Lab Units 06/19/21  0625   WBC Thousand/uL 6 42   HEMOGLOBIN g/dL 11 1*   HEMATOCRIT % 34 4*   PLATELETS Thousands/uL 163   NEUTROS PCT % 73   LYMPHS PCT % 13*   MONOS PCT % 10   EOS PCT % 2     Results from last 7 days   Lab Units 06/19/21  0625 06/18/21  0527   POTASSIUM mmol/L 3 4* 3 2*   CHLORIDE mmol/L 105 106   CO2 mmol/L 31 28   BUN mg/dL 8 10   CREATININE mg/dL 0 51* 0 66   CALCIUM mg/dL 8 4 7 8*   ALK PHOS U/L  --  68   ALT U/L  --  49   AST U/L  --  68*     Results from last 7 days   Lab Units 06/17/21  1121   INR  1 11       * I Have Reviewed All Lab Data Listed Above  * Additional Pertinent Lab Tests Reviewed: Lizz 66 Admission Reviewed    Imaging:    Imaging Reports Reviewed Today Include:  Chest x-ray reviewed      Recent Cultures (last 7 days):     Results from last 7 days   Lab Units 06/17/21  1725 06/17/21  1140 06/17/21  1121 06/14/21  1048   BLOOD CULTURE   --  No Growth at 24 hrs   No Growth at 24 hrs   --    URINE CULTURE   --   --   --  No Growth <1000 cfu/mL   LEGIONELLA URINARY ANTIGEN  Negative  --   --   --        Last 24 Hours Medication List:   Current Facility-Administered Medications   Medication Dose Route Frequency Provider Last Rate    acetaminophen  650 mg Oral Q6H PRN Isamar Cantu MD      aluminum-magnesium hydroxide-simethicone  30 mL Oral Q6H PRN Isamar Cantu MD      ampicillin-sulbactam  3 g Intravenous Q6H Isamar Cantu MD 3 g (06/19/21 0930)    aspirin  81 mg Oral Daily Isamar Cantu, MD      atorvastatin  10 mg Oral Daily Isamar Cantu, MD      doxazosin  4 mg Oral Daily Isamar Cantu MD      enoxaparin  40 mg Subcutaneous Daily Isamar Cantu, MD      gabapentin  300 mg Oral Daily Isamar Cantu, MD      gabapentin  600 mg Oral HS Isamar Cantu MD      ipratropium-albuterol  3 mL Nebulization Q4H PRN Isamar Cantu MD      LORazepam  1 mg Oral HS PRN Chiquita Heath PA-C      ondansetron  4 mg Intravenous Q6H PRN Isamar Cantu MD      sertraline  50 mg Oral Daily Isamar Cantu MD          Today, Patient Was Seen By: Maria Hopkins MD    ** Please Note: This note has been constructed using a voice recognition system   **

## 2021-06-19 NOTE — UTILIZATION REVIEW
Continued Stay Review    Date: 6/19/21                          Current Patient Class: IP  Current Level of Care: MS    HPI:88 y o  male initially admitted on 6/17/21 with aspiration neumonia    Assessment/Plan: Blood cultures neg x 24 hrs  Temp max 99 5 F today  Repeat CXR shows L lower lobe pneumonia  Pt with O2 use, being weaned from 3L to 2 L today, on room air this afternoon, sat in high 90's  ST recommending nectar thick liquids which pt is not agreeable to, understands the risk of recurrent aspiration pneumonia   Pt does not want any artificial feeding  Pt no longer having diarrhea, stool studies cancelled  Pt reports breathing better, nursing notes reflect pt dyspneic at rest this am with tachypnea and shallow , labored breathing  Crackles noted L side lungs  Pt continues on IV Unasyn  IVF d/c'ed   PT recommending post acute rehab upon discharge to reduce fall risk and maximize level of functional independence  Vital Signs:   Date/Time  Temp  Pulse  Resp  BP  MAP (mmHg)  SpO2  Calculated FIO2 (%) - Nasal Cannula  Nasal Cannula O2 Flow Rate (L/min)    06/19/21 15:23:53  97 5 °F (36 4 °C)  93  16  102/64  77  97 %  --  --    06/19/21 1200  99 5 °F (37 5 °C)  --  --  --  --  --  --  --    06/19/21 0744  98 6 °F (37 °C)  71  14  154/73  --  99 %  --  --    06/19/21 01:09:45  98 4 °F (36 9 °C)  75  34Abnormal   161/71  101  98 %  32  3 L/min    06/18/21 22:17:24  --  80  --  149/77  101  94 %  --  --    06/18/21 1530  --  --  --  --  --  --  28  2 L/min    06/18/21 1500  98 5 °F (36 9 °C)  95  18  145/74  --  97 %  --  --          Pertinent Labs/Diagnostic Results:   6/19 CXR-per physician red-left lower lobe pneumonia     Results from last 7 days   Lab Units 06/17/21  1121 06/14/21  1035   SARS-COV-2  Negative Negative     Results from last 7 days   Lab Units 06/19/21  0625 06/18/21  0527 06/17/21  1121   WBC Thousand/uL 6 42 6 40 7 56   HEMOGLOBIN g/dL 11 1* 11 2* 12 7   HEMATOCRIT % 34 4* 33 7* 37 3   PLATELETS Thousands/uL 163 148* 165   NEUTROS ABS Thousands/µL 4 79 4 87 6 31         Results from last 7 days   Lab Units 06/19/21  0625 06/18/21  0527 06/17/21  1121   SODIUM mmol/L 143 143 138   POTASSIUM mmol/L 3 4* 3 2* 3 6   CHLORIDE mmol/L 105 106 102   CO2 mmol/L 31 28 27   ANION GAP mmol/L 7 9 9   BUN mg/dL 8 10 16   CREATININE mg/dL 0 51* 0 66 0 82   EGFR ml/min/1 73sq m 96 86 79   CALCIUM mg/dL 8 4 7 8* 8 5   MAGNESIUM mg/dL  --  1 8  --      Results from last 7 days   Lab Units 06/18/21  0527 06/17/21  1121   AST U/L 68* 67*   ALT U/L 49 40   ALK PHOS U/L 68 78   TOTAL PROTEIN g/dL 6 0* 7 1   ALBUMIN g/dL 2 2* 2 7*   TOTAL BILIRUBIN mg/dL 0 49 0 55         Results from last 7 days   Lab Units 06/19/21  0625 06/18/21  0527 06/17/21  1121   GLUCOSE RANDOM mg/dL 94 92 128           Results from last 7 days   Lab Units 06/17/21  1121   TROPONIN I ng/mL <0 02         Results from last 7 days   Lab Units 06/17/21  1121   PROTIME seconds 14 4   INR  1 11   PTT seconds 35         Results from last 7 days   Lab Units 06/19/21  0625 06/18/21  0527 06/17/21  1200   PROCALCITONIN ng/ml 0 17 0 31* 0 55*     Results from last 7 days   Lab Units 06/17/21  1121   LACTIC ACID mmol/L 1 1       Results from last 7 days   Lab Units 06/17/21  1223 06/14/21  1047   CLARITY UA  Clear clear   COLOR UA  Yellow yellow   SPEC GRAV UA  1 010  --    PH UA  6 0  --    GLUCOSE UA mg/dl Negative neg   KETONES UA mg/dl Negative neg   BLOOD UA  Small* neg   PROTEIN UA mg/dl Negative neg   NITRITE UA  Negative neg   BILIRUBIN UA  Negative  --    BILIRUBIN UA POC   --  neg   UROBILINOGEN UA E U /dl 1 0 0 2   LEUKOCYTES UA  Negative neg   WBC UA /hpf 0-1  --    RBC UA /hpf 1-2  --    BACTERIA UA /hpf Occasional  --    EPITHELIAL CELLS WET PREP /hpf Occasional  --      Results from last 7 days   Lab Units 06/17/21  1725 06/17/21  1724   STREP PNEUMONIAE ANTIGEN, URINE   --  Negative   LEGIONELLA URINARY ANTIGEN  Negative  --            Results from last 7 days   Lab Units 06/17/21  1140 06/17/21  1121 06/14/21  1048   BLOOD CULTURE  No Growth at 48 hrs  No Growth at 48 hrs  --    URINE CULTURE   --   --  No Growth <1000 cfu/mL           Medications:   Scheduled Medications:  ampicillin-sulbactam, 3 g, Intravenous, Q6H  aspirin, 81 mg, Oral, Daily  atorvastatin, 10 mg, Oral, Daily  doxazosin, 4 mg, Oral, Daily  enoxaparin, 40 mg, Subcutaneous, Daily  gabapentin, 300 mg, Oral, Daily  gabapentin, 600 mg, Oral, HS  guaiFENesin, 600 mg, Oral, Q12H BRANDEE  sertraline, 50 mg, Oral, Daily  potassium chloride (K-DUR,KLOR-CON) CR tablet 40 mEq   Dose: 40 mEq  Freq: Once Route: PO x1 6/19    Continuous IV Infusions:sodium chloride 0 9 % infusion   Rate: 100 mL/hr Dose: 100 mL/hr  Freq: Continuous Route: IV  Indications of Use: IV Hydration  Last Dose: Stopped (06/19/21 0045)     PRN Meds:  acetaminophen, 650 mg, Oral, Q6H PRN x1 6/19  aluminum-magnesium hydroxide-simethicone, 30 mL, Oral, Q6H PRN  benzonatate, 100 mg, Oral, TID PRN  ipratropium-albuterol, 3 mL, Nebulization, Q4H PRN x1 6/19  LORazepam, 1 mg, Oral, HS PRN x1 6/18  ondansetron, 4 mg, Intravenous, Q6H PRN        Discharge Plan: D    Network Utilization Review Department  ATTENTION: Please call with any questions or concerns to [phone] and carefully listen to the prompts so that you are directed to the right person  All voicemails are confidential   [facility] all requests for admission clinical reviews, approved or denied determinations and any other requests to dedicated fax number below belonging to the campus where the patient is receiving treatment   List of dedicated fax numbers for the Facilities:  [address] DENIALS (Administrative/Medical Necessity) [phone]   [address] [place] [address] (Maternity/NICU/Pediatrics) 261 [facility]  [address] [id number] [place] [address] [address] [place] [address] [phone]

## 2021-06-19 NOTE — PHYSICAL THERAPY NOTE
PHYSICAL THERAPY EVALUATION NOTE    Patient Name: Fifi Rollins  [id number] Date: 6/19/2021  AGE:   80 y o  Mrn:   [id number]  ADMIT DX:  Dizziness [R42]  Weakness [R53 1]  Community acquired pneumonia [J18 9]    Past Medical History:   Diagnosis Date    Coronary artery disease     Hyperlipidemia      Length Of Stay: 2  PHYSICAL THERAPY EVALUATION :    06/19/21 1349   PT Last Visit   PT Visit Date 06/19/21   Pain Assessment   Pain Assessment Tool 0-10   Pain Score 2   Pain Location/Orientation Location: Head   Home Living   Type of [address] [place] [address] One level; Other (Comment)  (1 GIANA)   Additional Comments lives w/ spouse  ambulates w/ cane  owns walker  independent w/ ADLs  8 falls in last 6 months  no history of supplemental oxygen use  Prior Function   Comments pt seen supine in bed  agreed to PT eval  reports having a headache  Restrictions/Precautions   Other Precautions Chair Alarm; Bed Alarm;O2;Fall Risk;Pain  ([place])   General   Additional Pertinent History 2L oxygen via nasal cannula  resting pulse ox 95% and 95 BPM, active 91% and 106 BPM    Family/Caregiver Present Yes  (pt's family entered during session )   Cognition   Arousal/Participation Alert   Orientation Level Oriented to person; Other (Comment)  (pt was identified w/ full name, birth date)   Following Commands Follows one step commands without difficulty   RUE Assessment   RUE Assessment WFL   LUE Assessment   LUE Assessment WFL   RLE Assessment   RLE Assessment WFL  (3+ to 4-/5)   LLE Assessment   LLE Assessment WFL  (3+ to 4-/5)   Coordination   Sensation X  (light touch impaired feet)   Bed Mobility   Additional Comments 30 second chair stand: 0 (as pt is unable to stand w/o use of UEs)  Transfers   Sit to Stand 3  Moderate assistance   Additional items Assist x 1; Increased time required;Verbal cues  (for LE positioning)   Stand to Sit 4  Minimal assistance   Additional items Assist x 1;Verbal cues  (for body positioning, controlled descent)   Ambulation/Elevation   Gait pattern Forward Flexion; Short stride; Excessively slow; Inconsistent moo   Gait Assistance 3  Moderate assist   Additional items Assist x 1;Verbal cues; Tactile cues  (for cane positioning, full step length)   Assistive Device SPC   Distance 6 feet  (additional not possible due to fatigue)   Stair Management Assistance Not tested  (due to limited ambulation tolerance)   Balance   Static Sitting Fair   Static Standing Poor   Ambulatory Poor  (w/ cane)   Activity Tolerance   Activity Tolerance Patient limited by fatigue   Nurse Made Aware spoke to [phone] [place]   Assessment   Prognosis Fair   Problem List Decreased strength;Decreased endurance; Impaired balance;Decreased mobility; Decreased safety awareness; Impaired sensation;Pain   Assessment Pt presents with not feeling well, fever, and nausea  Dx: aspiration pneuonia, dysphagia, ambulatory dysfunction, and diarrhea  order placed for PT eval and tx, w/ activity order of up w/ A  pt presents w/ comorbidities of CAD, hyperlipidemia, and NOVA and personal factors of advanced age, mobilizing w/ assistive device, stair(s) to enter home and positive fall history  pt presents w/ pain, weakness, decreased endurance, impaired balance, gait deviations, altered sensation, decreased safety awareness and fall risk  these impairments are evident in findings from physical examination (weakness and altered sensation), mobility assessment (need for min to mod assist w/ all phases of mobility when usually mobilizing independently, tolerance to only 6 feet of ambulation and need for cueing for mobility technique), and Barthel Index: 50/100 and 30 second chair stand test: 0 (less than 8 indicates fall risk in males 80to 80years old)  pt needed input for mobility and breathing technique  pt is at risk for falls due to physical and safety awareness deficits   pt's clinical presentation is unstable/unpredictable (evident in need for assist w/ all phases of mobility when usually mobilizing independently, tolerance to only 30 feet of ambulation, need for supplemental oxygen in order to maintain oxygen saturation and need for input for mobility technique/safety)  pt needs inpatient PT tx to improve mobility deficits and progress mobility training as appropriate  discharge recommendation is for inpatient rehab to reduce fall risk and maximize level of functional independence  Goals   Patient Goals to be able to do things for myself  STG Expiration Date 06/29/21   Short Term Goal #1 pt will: Increase bilateral LE strength 1/2 grade to facilitate independent mobility, Perform all bed mobility tasks modified independent to decrease fall risk factors, Perform all transfers w/ supervision to improve independence, Ambulate 150 ft  with least restrictive assistive device w/ supervision w/o LOB to expedite return to independent level of function, Navigate 1 stair w/ minx1 with unilateral handrail to facilitate return to previous living environment, Increase ambulatory balance 1 grade to decrease risk for falls, Complete exercise program independently to increase strength and endurance, Tolerate 3 hr OOB to faciliate upright tolerance, Improve Barthel Index score to 75 or greater to facilitate independence and Increase 30 second chair stand test score to 4 or greater to decrease risk for falls   Plan   Treatment/Interventions Functional transfer training;LE strengthening/ROM; Therapeutic exercise; Endurance training;Elevations; Patient/family training;Equipment eval/education; Bed mobility;Gait training   PT Frequency 5x/wk   Recommendation   PT Discharge Recommendation Post acute rehabilitation services   AM-PAC Basic Mobility Inpatient   Turning in Bed Without Bedrails 4   Lying on Back to Sitting on Edge of Flat Bed 3   Moving Bed to Chair 3   Standing Up From Chair 3   Walk in Room 3 Climb 3-5 Stairs 1   Basic Mobility Inpatient Raw Score 17   Basic Mobility Standardized Score 39 67   Barthel Index   Feeding 10   Bathing 0   Grooming Score 5   Dressing Score 5   Bladder Score 5   Bowels Score 10   Toilet Use Score 5   Transfers (Bed/Chair) Score 10   Mobility (Level Surface) Score 0   Stairs Score 0   Barthel Index Score 50     The patient's AM-PAC Basic Mobility Inpatient Short Form Raw Score is 17, Standardized Score is 39 67  A standardized score less than 42 9 suggests the patient may benefit from discharge to post-acute rehabilitation services  Please also refer to the recommendation of the Physical Therapist for safe discharge planning  30 second chair stand test: 0 (less than 8 indicates fall risk in males 80to 80years old)  Skilled PT recommended while in hospital and upon DC to progress pt toward treatment goals       Peter Hoang, PT

## 2021-06-20 VITALS
BODY MASS INDEX: 26.72 KG/M2 | HEIGHT: 64 IN | SYSTOLIC BLOOD PRESSURE: 98 MMHG | DIASTOLIC BLOOD PRESSURE: 52 MMHG | RESPIRATION RATE: 18 BRPM | TEMPERATURE: 97.7 F | WEIGHT: 156.53 LBS | OXYGEN SATURATION: 94 % | HEART RATE: 80 BPM

## 2021-06-20 LAB
ANION GAP SERPL CALCULATED.3IONS-SCNC: 6 MMOL/L (ref 4–13)
BUN SERPL-MCNC: 10 MG/DL (ref 5–25)
CALCIUM SERPL-MCNC: 8 MG/DL (ref 8.3–10.1)
CHLORIDE SERPL-SCNC: 105 MMOL/L (ref 100–108)
CO2 SERPL-SCNC: 31 MMOL/L (ref 21–32)
CREAT SERPL-MCNC: 0.64 MG/DL (ref 0.6–1.3)
ERYTHROCYTE [DISTWIDTH] IN BLOOD BY AUTOMATED COUNT: 12.7 % (ref 11.6–15.1)
GFR SERPL CREATININE-BSD FRML MDRD: 87 ML/MIN/1.73SQ M
GLUCOSE SERPL-MCNC: 131 MG/DL (ref 65–140)
HCT VFR BLD AUTO: 32.7 % (ref 36.5–49.3)
HGB BLD-MCNC: 10.8 G/DL (ref 12–17)
MAGNESIUM SERPL-MCNC: 1.8 MG/DL (ref 1.6–2.6)
MCH RBC QN AUTO: 30.3 PG (ref 26.8–34.3)
MCHC RBC AUTO-ENTMCNC: 33 G/DL (ref 31.4–37.4)
MCV RBC AUTO: 92 FL (ref 82–98)
PLATELET # BLD AUTO: 176 THOUSANDS/UL (ref 149–390)
PMV BLD AUTO: 9.6 FL (ref 8.9–12.7)
POTASSIUM SERPL-SCNC: 3.3 MMOL/L (ref 3.5–5.3)
PROCALCITONIN SERPL-MCNC: 0.12 NG/ML
RBC # BLD AUTO: 3.56 MILLION/UL (ref 3.88–5.62)
SODIUM SERPL-SCNC: 142 MMOL/L (ref 136–145)
WBC # BLD AUTO: 7.13 THOUSAND/UL (ref 4.31–10.16)

## 2021-06-20 PROCEDURE — 99239 HOSP IP/OBS DSCHRG MGMT >30: CPT | Performed by: HOSPITALIST

## 2021-06-20 PROCEDURE — 94640 AIRWAY INHALATION TREATMENT: CPT

## 2021-06-20 PROCEDURE — 80048 BASIC METABOLIC PNL TOTAL CA: CPT | Performed by: INTERNAL MEDICINE

## 2021-06-20 PROCEDURE — 84145 PROCALCITONIN (PCT): CPT | Performed by: INTERNAL MEDICINE

## 2021-06-20 PROCEDURE — 83735 ASSAY OF MAGNESIUM: CPT | Performed by: INTERNAL MEDICINE

## 2021-06-20 PROCEDURE — 94760 N-INVAS EAR/PLS OXIMETRY 1: CPT

## 2021-06-20 PROCEDURE — 85027 COMPLETE CBC AUTOMATED: CPT | Performed by: INTERNAL MEDICINE

## 2021-06-20 RX ORDER — ECHINACEA PURPUREA EXTRACT 125 MG
1 TABLET ORAL
Qty: 15 ML | Refills: 0 | Status: SHIPPED | OUTPATIENT
Start: 2021-06-20

## 2021-06-20 RX ORDER — GUAIFENESIN 600 MG/1
600 TABLET, EXTENDED RELEASE ORAL EVERY 12 HOURS PRN
Qty: 30 TABLET | Refills: 0 | Status: SHIPPED | OUTPATIENT
Start: 2021-06-20

## 2021-06-20 RX ORDER — MAGNESIUM SULFATE HEPTAHYDRATE 40 MG/ML
2 INJECTION, SOLUTION INTRAVENOUS ONCE
Status: COMPLETED | OUTPATIENT
Start: 2021-06-20 | End: 2021-06-20

## 2021-06-20 RX ORDER — BENZONATATE 100 MG/1
100 CAPSULE ORAL 3 TIMES DAILY PRN
Qty: 20 CAPSULE | Refills: 0 | Status: SHIPPED | OUTPATIENT
Start: 2021-06-20

## 2021-06-20 RX ORDER — POTASSIUM CHLORIDE 20 MEQ/1
40 TABLET, EXTENDED RELEASE ORAL ONCE
Status: COMPLETED | OUTPATIENT
Start: 2021-06-20 | End: 2021-06-20

## 2021-06-20 RX ORDER — CEPHALEXIN 500 MG/1
500 CAPSULE ORAL EVERY 6 HOURS SCHEDULED
Qty: 4 CAPSULE | Refills: 0 | Status: SHIPPED | OUTPATIENT
Start: 2021-06-21 | End: 2021-06-22

## 2021-06-20 RX ORDER — ECHINACEA PURPUREA EXTRACT 125 MG
1 TABLET ORAL
Status: DISCONTINUED | OUTPATIENT
Start: 2021-06-20 | End: 2021-06-20 | Stop reason: HOSPADM

## 2021-06-20 RX ADMIN — SODIUM CHLORIDE 3 G: 9 INJECTION, SOLUTION INTRAVENOUS at 03:19

## 2021-06-20 RX ADMIN — ENOXAPARIN SODIUM 40 MG: 40 INJECTION SUBCUTANEOUS at 07:56

## 2021-06-20 RX ADMIN — ASPIRIN 81 MG: 81 TABLET, CHEWABLE ORAL at 07:58

## 2021-06-20 RX ADMIN — GUAIFENESIN 600 MG: 600 TABLET, EXTENDED RELEASE ORAL at 07:59

## 2021-06-20 RX ADMIN — IPRATROPIUM BROMIDE AND ALBUTEROL SULFATE 3 ML: 2.5; .5 SOLUTION RESPIRATORY (INHALATION) at 01:47

## 2021-06-20 RX ADMIN — SERTRALINE HYDROCHLORIDE 50 MG: 50 TABLET ORAL at 07:58

## 2021-06-20 RX ADMIN — MAGNESIUM SULFATE HEPTAHYDRATE 2 G: 40 INJECTION, SOLUTION INTRAVENOUS at 07:45

## 2021-06-20 RX ADMIN — ATORVASTATIN CALCIUM 10 MG: 10 TABLET, FILM COATED ORAL at 07:58

## 2021-06-20 RX ADMIN — SODIUM CHLORIDE 3 G: 9 INJECTION, SOLUTION INTRAVENOUS at 09:23

## 2021-06-20 RX ADMIN — GABAPENTIN 300 MG: 300 CAPSULE ORAL at 07:57

## 2021-06-20 RX ADMIN — POTASSIUM CHLORIDE 40 MEQ: 1500 TABLET, EXTENDED RELEASE ORAL at 07:57

## 2021-06-20 RX ADMIN — ACETAMINOPHEN 650 MG: 325 TABLET, FILM COATED ORAL at 01:23

## 2021-06-20 RX ADMIN — DOXAZOSIN 4 MG: 4 TABLET ORAL at 07:58

## 2021-06-20 NOTE — PLAN OF CARE
Problem: MOBILITY - ADULT  Goal: Maintain or return to baseline ADL function  Description: INTERVENTIONS:  -  Assess patient's ability to carry out ADLs; assess patient's baseline for ADL function and identify physical deficits which impact ability to perform ADLs (bathing, care of mouth/teeth, toileting, grooming, dressing, etc )  - Assess/evaluate cause of self-care deficits   - Assess range of motion  - Assess patient's mobility; develop plan if impaired  - Assess patient's need for assistive devices and provide as appropriate  - Encourage maximum independence but intervene and supervise when necessary  - Involve family in performance of ADLs  - Assess for home care needs following discharge   - Consider OT consult to assist with ADL evaluation and planning for discharge  - Provide patient education as appropriate  Outcome: Progressing  Goal: Maintains/Returns to pre admission functional level  Description: INTERVENTIONS:  - Perform BMAT or MOVE assessment daily    - Set and communicate daily mobility goal to care team and patient/family/caregiver     - Collaborate with rehabilitation services on mobility goals if consulted    - Out of bed for toileting  - Record patient progress and toleration of activity level   Outcome: Progressing     Problem: SAFETY ADULT  Goal: Maintain or return to baseline ADL function  Description: INTERVENTIONS:  -  Assess patient's ability to carry out ADLs; assess patient's baseline for ADL function and identify physical deficits which impact ability to perform ADLs (bathing, care of mouth/teeth, toileting, grooming, dressing, etc )  - Assess/evaluate cause of self-care deficits   - Assess range of motion  - Assess patient's mobility; develop plan if impaired  - Assess patient's need for assistive devices and provide as appropriate  - Encourage maximum independence but intervene and supervise when necessary  - Involve family in performance of ADLs  - Assess for home care needs following discharge   - Consider OT consult to assist with ADL evaluation and planning for discharge  - Provide patient education as appropriate  Outcome: Progressing  Goal: Maintains/Returns to pre admission functional level  Description: INTERVENTIONS:  - Perform BMAT or MOVE assessment daily    - Set and communicate daily mobility goal to care team and patient/family/caregiver     - Collaborate with rehabilitation services on mobility goals if consulted    - Record patient progress and toleration of activity level   Outcome: Progressing  Goal: Patient will remain free of falls  Description: INTERVENTIONS:  - Educate patient/family on patient safety including physical limitations  - Instruct patient to call for assistance with activity   - Consult OT/PT to assist with strengthening/mobility   - Keep Call bell within reach  - Keep bed low and locked with side rails adjusted as appropriate  - Keep care items and personal belongings within reach  - Initiate and maintain comfort rounds  - Make Fall Risk Sign visible to staff  -     - Apply yellow socks and bracelet for high fall risk patients  - Consider moving patient to room near nurses station  Outcome: Progressing     Problem: Potential for Falls  Goal: Patient will remain free of falls  Description: INTERVENTIONS:  - Educate patient/family on patient safety including physical limitations  - Instruct patient to call for assistance with activity   - Consult OT/PT to assist with strengthening/mobility   - Keep Call bell within reach  - Keep bed low and locked with side rails adjusted as appropriate  - Keep care items and personal belongings within reach  - Initiate and maintain comfort rounds  - Make Fall Risk Sign visible to staff    - Apply yellow socks and bracelet for high fall risk patients  - Consider moving patient to room near nurses station  Outcome: Progressing     Problem: Prexisting or High Potential for Compromised Skin Integrity  Goal: Skin integrity is maintained or improved  Description: INTERVENTIONS:  - Identify patients at risk for skin breakdown  - Assess and monitor skin integrity  - Assess and monitor nutrition and hydration status  - Monitor labs   - Assess for incontinence   - Turn and reposition patient  - Assist with mobility/ambulation  - Relieve pressure over bony prominences  - Avoid friction and shearing  - Provide appropriate hygiene as needed including keeping skin clean and dry  - Evaluate need for skin moisturizer/barrier cream  - Collaborate with interdisciplinary team   - Patient/family teaching  - Consider wound care consult   Outcome: Progressing     Problem: Nutrition/Hydration-ADULT  Goal: Nutrient/Hydration intake appropriate for improving, restoring or maintaining nutritional needs  Description: Monitor and assess patient's nutrition/hydration status for malnutrition  Collaborate with interdisciplinary team and initiate plan and interventions as ordered  Monitor patient's weight and dietary intake as ordered or per policy  Utilize nutrition screening tool and intervene as necessary  Determine patient's food preferences and provide high-protein, high-caloric foods as appropriate       INTERVENTIONS:  - Monitor oral intake, urinary output, labs, and treatment plans  - Assess nutrition and hydration status and recommend course of action  - Evaluate amount of meals eaten  - Assist patient with eating if necessary   - Allow adequate time for meals  - Recommend/ encourage appropriate diets, oral nutritional supplements, and vitamin/mineral supplements  - Order, calculate, and assess calorie counts as needed  - Recommend, monitor, and adjust tube feedings and TPN/PPN based on assessed needs  - Assess need for intravenous fluids  - Provide specific nutrition/hydration education as appropriate  - Include patient/family/caregiver in decisions related to nutrition  Outcome: Progressing

## 2021-06-20 NOTE — ASSESSMENT & PLAN NOTE
Patient usually ambulates with a cane presently with generalized weakness ambulating with walker  Felling better now  · Physical therapy/ occupational therapy evaluation - STR recommended, patient wanting Home with [facility] working on getting this arranged

## 2021-06-20 NOTE — UTILIZATION REVIEW
Continued Stay Review    Date: 6/20/21                          Current Patient Class: IP  Current Level of Care: MS    HPI:88 y o  male initially admitted on 6/17/21 for aspiration pneumonia, dysphagia    Assessment/Plan: Pt weaned off O2 with sats mid 90's  Pt has rales bilat lower lung fields  Pt continues on IV Unasyn, day 4 with plan to treat with keflex for 1 more day to complete 5 day treatment  PT recommending STR but pt wants home with [facility]  IPCM working on this  Pt c/o globus sensation in throat, says clears with warm water gargles at home, resolved with Bee Boyce,Pt eating and drinking well  Pt prefers reg diet rather than modified diet recommended by   Family adamant that pt stick with recommended diet  Magnesium and potassium low, repleted today  Vital Signs:   Date/Time  Temp  Pulse  Resp  BP  MAP (mmHg)  SpO2    06/20/21 11:48:07  97 7 °F (36 5 °C)  80  --  98/52  67  94 %    06/20/21 07:52:20  98 1 °F (36 7 °C)  70  --  144/68  93  96 %    06/19/21 22:38:38  99 4 °F (37 4 °C)  76  18  126/63  84  94 %          Pertinent Labs/Diagnostic Results:   6/19 CXR-Persistent pleuroparenchymal opacity at the left base with elevated left hemidiaphragm consistent with pneumonia/atelectasis and possibly a small effusion      Results from last 7 days   Lab Units 06/17/21  1121 06/14/21  1035   SARS-COV-2  Negative Negative     Results from last 7 days   Lab Units 06/20/21  0333 06/19/21  0625 06/18/21  0527 06/17/21  1121   WBC Thousand/uL 7 13 6 42 6 40 7 56   HEMOGLOBIN g/dL 10 8* 11 1* 11 2* 12 7   HEMATOCRIT % 32 7* 34 4* 33 7* 37 3   PLATELETS Thousands/uL 176 163 148* 165   NEUTROS ABS Thousands/µL  --  4 79 4 87 6 31         Results from last 7 days   Lab Units 06/20/21  0333 06/19/21  0625 06/18/21  0527 06/17/21  1121   SODIUM mmol/L 142 143 143 138   POTASSIUM mmol/L 3 3* 3 4* 3 2* 3 6   CHLORIDE mmol/L 105 105 106 102   CO2 mmol/L 31 31 28 27   ANION GAP mmol/L 6 7 9 9   BUN mg/dL 10 8 10 16 CREATININE mg/dL 0 64 0 51* 0 66 0 82   EGFR ml/min/1 73sq m 87 96 86 79   CALCIUM mg/dL 8 0* 8 4 7 8* 8 5   MAGNESIUM mg/dL 1 8  --  1 8  --      Results from last 7 days   Lab Units 06/18/21  0527 06/17/21  1121   AST U/L 68* 67*   ALT U/L 49 40   ALK PHOS U/L 68 78   TOTAL PROTEIN g/dL 6 0* 7 1   ALBUMIN g/dL 2 2* 2 7*   TOTAL BILIRUBIN mg/dL 0 49 0 55         Results from last 7 days   Lab Units 06/20/21  0333 06/19/21  0625 06/18/21  0527 06/17/21  1121   GLUCOSE RANDOM mg/dL 131 94 92 128           Results from last 7 days   Lab Units 06/17/21  1121   TROPONIN I ng/mL <0 02         Results from last 7 days   Lab Units 06/17/21  1121   PROTIME seconds 14 4   INR  1 11   PTT seconds 35         Results from last 7 days   Lab Units 06/20/21  0333 06/19/21  0625 06/18/21  0527 06/17/21  1200   PROCALCITONIN ng/ml 0 12 0 17 0 31* 0 55*     Results from last 7 days   Lab Units 06/17/21  1121   LACTIC ACID mmol/L 1 1           Results from last 7 days   Lab Units 06/17/21  1223 06/14/21  1047   CLARITY UA  Clear clear   COLOR UA  Yellow yellow   SPEC GRAV UA  1 010  --    PH UA  6 0  --    GLUCOSE UA mg/dl Negative neg   KETONES UA mg/dl Negative neg   BLOOD UA  Small* neg   PROTEIN UA mg/dl Negative neg   NITRITE UA  Negative neg   BILIRUBIN UA  Negative  --    BILIRUBIN UA POC   --  neg   UROBILINOGEN UA E U /dl 1 0 0 2   LEUKOCYTES UA  Negative neg   WBC UA /hpf 0-1  --    RBC UA /hpf 1-2  --    BACTERIA UA /hpf Occasional  --    EPITHELIAL CELLS WET PREP /hpf Occasional  --      Results from last 7 days   Lab Units 06/17/21  1725 06/17/21  1724   STREP PNEUMONIAE ANTIGEN, URINE   --  Negative   LEGIONELLA URINARY ANTIGEN  Negative  --            Results from last 7 days   Lab Units 06/17/21  1140 06/17/21  1121 06/14/21  1048   BLOOD CULTURE  No Growth at 48 hrs  No Growth at 48 hrs    --    URINE CULTURE   --   --  No Growth <1000 cfu/mL           Medications:   Scheduled Medications:  ampicillin-sulbactam, 3 g, Intravenous, Q6H  aspirin, 81 mg, Oral, Daily  atorvastatin, 10 mg, Oral, Daily  doxazosin, 4 mg, Oral, Daily  enoxaparin, 40 mg, Subcutaneous, Daily  gabapentin, 300 mg, Oral, Daily  gabapentin, 600 mg, Oral, HS  guaiFENesin, 600 mg, Oral, Q12H BRANDEE  sertraline, 50 mg, Oral, Daily  magnesium sulfate 2 g/50 mL IVPB (premix) 2 g   Dose: 2 g  Freq: Once Route: IV x1 6/20  potassium chloride (K-DUR,KLOR-CON) CR tablet 40 mEq   Dose: 40 mEq  Freq: Once Route: PO x1 6/20  Continuous IV Infusions:     PRN Meds:  acetaminophen, 650 mg, Oral, Q6H PRN x1 6/20  aluminum-magnesium hydroxide-simethicone, 30 mL, Oral, Q6H PRN  benzonatate, 100 mg, Oral, TID PRN  ipratropium-albuterol, 3 mL, Nebulization, Q4H PRN x1 6/20  LORazepam, 1 mg, Oral, HS PRN  ondansetron, 4 mg, Intravenous, Q6H PRN  sodium chloride, 1 spray, Each Nare, Q1H PRN        Discharge Plan: D    Network Utilization Review Department  ATTENTION: Please call with any questions or concerns to [phone] and carefully listen to the prompts so that you are directed to the right person  All voicemails are confidential   Mimi Skipper all requests for admission clinical reviews, approved or denied determinations and any other requests to dedicated fax number below belonging to the campus where the patient is receiving treatment   List of dedicated fax numbers for the Facilities:  [address] DENIALS (Administrative/Medical Necessity) [phone]   [address] (Maternity/NICU/Pediatrics) [phone]   [address] Holley Juaresel Taylor [address] [id number] [place] [address] [phone]   Hollie Cui [address] [place] [place] [address] [address] [place] [address] [phone]

## 2021-06-20 NOTE — ASSESSMENT & PLAN NOTE
Presents with fever cough not feeling well  x-ray suggestive of possible left-sided pneumonia  Procalcitonin 0 5 --> 0 3, COVID negative  Legionella, strep negative   Blood cultures -24 hours    Repeat chest x-ray - left lower lobe pneumonia  Weaned off oxygen  Plan  · IV Unasyn day 4, will treat with keflex for 1 more day to complete 5 day treatment  · Appreciate speech eval recommendation: Dysphagia level 3- soft to chew/dental soft,  Nectar thick liquids, Pt to take PO/Meds only when fully alert and upright  Slow rate, small bites/sips, alternate between liquids and solids  · Discussed with the patient speech eval results, patient is not agreeable with thickened liquids would like to stick with thin liquids, patient understand risks of recurrent aspiration pneumonia, patient understand this risk  · Family at bedside adamant he stick to a recommended diet  Asked to get a list from speech therapy  Discussed with Tamia Friday over the phone and explained to patient and family

## 2021-06-20 NOTE — PLAN OF CARE
Problem: MOBILITY - ADULT  Goal: Maintain or return to baseline ADL function  Description: INTERVENTIONS:  -  Assess patient's ability to carry out ADLs; assess patient's baseline for ADL function and identify physical deficits which impact ability to perform ADLs (bathing, care of mouth/teeth, toileting, grooming, dressing, etc )  - Assess/evaluate cause of self-care deficits   - Assess range of motion  - Assess patient's mobility; develop plan if impaired  - Assess patient's need for assistive devices and provide as appropriate  - Encourage maximum independence but intervene and supervise when necessary  - Involve family in performance of ADLs  - Assess for home care needs following discharge   - Consider OT consult to assist with ADL evaluation and planning for discharge  - Provide patient education as appropriate  6/20/2021 1457 by Gabriel Gómez RN  Outcome: Adequate for Discharge  6/20/2021 1457 by Gabriel Gómez RN  Outcome: Adequate for Discharge  6/20/2021 1433 by Gabriel Gómez RN  Outcome: Progressing  Goal: Maintains/Returns to pre admission functional level  Description: INTERVENTIONS:  - Perform BMAT or MOVE assessment daily    - Set and communicate daily mobility goal to care team and patient/family/caregiver     - Collaborate with rehabilitation services on mobility goals if consulted    - Out of bed for toileting  - Record patient progress and toleration of activity level   6/20/2021 1457 by Gabriel Gómez RN  Outcome: Adequate for Discharge  6/20/2021 1457 by Gabriel Gómez RN  Outcome: Adequate for Discharge  6/20/2021 1433 by Gabriel Gómez RN  Outcome: Progressing     Problem: SAFETY ADULT  Goal: Maintain or return to baseline ADL function  Description: INTERVENTIONS:  -  Assess patient's ability to carry out ADLs; assess patient's baseline for ADL function and identify physical deficits which impact ability to perform ADLs (bathing, care of mouth/teeth, toileting, grooming, dressing, etc )  - Assess/evaluate cause of self-care deficits   - Assess range of motion  - Assess patient's mobility; develop plan if impaired  - Assess patient's need for assistive devices and provide as appropriate  - Encourage maximum independence but intervene and supervise when necessary  - Involve family in performance of ADLs  - Assess for home care needs following discharge   - Consider OT consult to assist with ADL evaluation and planning for discharge  - Provide patient education as appropriate  6/20/2021 1457 by Twila Kay RN  Outcome: Adequate for Discharge  6/20/2021 1457 by Twila Kay RN  Outcome: Adequate for Discharge  6/20/2021 1433 by Twila Kay RN  Outcome: Progressing  Goal: Maintains/Returns to pre admission functional level  Description: INTERVENTIONS:  - Perform BMAT or MOVE assessment daily    - Set and communicate daily mobility goal to care team and patient/family/caregiver     - Collaborate with rehabilitation services on mobility goals if consulted    - Out of bed for toileting  - Record patient progress and toleration of activity level   6/20/2021 1457 by Twila Kay RN  Outcome: Adequate for Discharge  6/20/2021 1457 by Twila Kay RN  Outcome: Adequate for Discharge  6/20/2021 1433 by Twila Kay RN  Outcome: Progressing  Goal: Patient will remain free of falls  Description: INTERVENTIONS:  - Educate patient/family on patient safety including physical limitations  - Instruct patient to call for assistance with activity   - Consult OT/PT to assist with strengthening/mobility   - Keep Call bell within reach  - Keep bed low and locked with side rails adjusted as appropriate  - Keep care items and personal belongings within reach  - Initiate and maintain comfort rounds  - Make Fall Risk Sign visible to staff  -  - Apply yellow socks and bracelet for high fall risk patients  - Consider moving patient to room near nurses station  6/20/2021 1457 by Twila Kay RN  Outcome: Adequate for Discharge  6/20/2021 1457 by Twila Kay RN  Outcome: Adequate for Discharge  6/20/2021 1433 by Twila Kay RN  Outcome: Progressing     Problem: Potential for Falls  Goal: Patient will remain free of falls  Description: INTERVENTIONS:  - Educate patient/family on patient safety including physical limitations  - Instruct patient to call for assistance with activity   - Consult OT/PT to assist with strengthening/mobility   - Keep Call bell within reach  - Keep bed low and locked with side rails adjusted as appropriate  - Keep care items and personal belongings within reach  - Initiate and maintain comfort rounds  - Make Fall Risk Sign visible to staff    - Apply yellow socks and bracelet for high fall risk patients  - Consider moving patient to room near nurses station  6/20/2021 1457 by Twila Kay RN  Outcome: Adequate for Discharge  6/20/2021 1457 by Twila Kay RN  Outcome: Adequate for Discharge  6/20/2021 1433 by Twila Kay RN  Outcome: Progressing     Problem: Prexisting or High Potential for Compromised Skin Integrity  Goal: Skin integrity is maintained or improved  Description: INTERVENTIONS:  - Identify patients at risk for skin breakdown  - Assess and monitor skin integrity  - Assess and monitor nutrition and hydration status  - Monitor labs   - Assess for incontinence   - Turn and reposition patient  - Assist with mobility/ambulation  - Relieve pressure over bony prominences  - Avoid friction and shearing  - Provide appropriate hygiene as needed including keeping skin clean and dry  - Evaluate need for skin moisturizer/barrier cream  - Collaborate with interdisciplinary team   - Patient/family teaching  - Consider wound care consult   6/20/2021 1457 by Twila Kay RN  Outcome: Adequate for Discharge  6/20/2021 1457 by Twila Kay RN  Outcome: Adequate for Discharge  6/20/2021 1433 by Twila Kay RN  Outcome: Progressing     Problem: Nutrition/Hydration-ADULT  Goal: Nutrient/Hydration intake appropriate for improving, restoring or maintaining nutritional needs  Description: Monitor and assess patient's nutrition/hydration status for malnutrition  Collaborate with interdisciplinary team and initiate plan and interventions as ordered  Monitor patient's weight and dietary intake as ordered or per policy  Utilize nutrition screening tool and intervene as necessary  Determine patient's food preferences and provide high-protein, high-caloric foods as appropriate       INTERVENTIONS:  - Monitor oral intake, urinary output, labs, and treatment plans  - Assess nutrition and hydration status and recommend course of action  - Evaluate amount of meals eaten  - Assist patient with eating if necessary   - Allow adequate time for meals  - Recommend/ encourage appropriate diets, oral nutritional supplements, and vitamin/mineral supplements  - Order, calculate, and assess calorie counts as needed  - Recommend, monitor, and adjust tube feedings and TPN/PPN based on assessed needs  - Assess need for intravenous fluids  - Provide specific nutrition/hydration education as appropriate  - Include patient/family/caregiver in decisions related to nutrition  6/20/2021 1457 by Chiara Chandler RN  Outcome: Adequate for Discharge  6/20/2021 1457 by Chiara Chandler RN  Outcome: Adequate for Discharge  6/20/2021 1433 by Chiara Chandler, RN  Outcome: Progressing

## 2021-06-20 NOTE — CASE MANAGEMENT
CM sent 7 [facility] referrals via [facility]  2 have yet to respond, 4 cannot accept d/t staffing or insurance, but [facility] is able to accept for PT/OT/ST  Contact information placed on AVS and RN and SLIM are aware  Patient and spouse are also aware that [facility] will be contacting him to set up services at home

## 2021-06-20 NOTE — CASE MANAGEMENT
LOS: 3 DAYS  PATIENT IS NOT A BUNDLE  PATIENT IS NOT A READMISSION  UNPLANNED RISK OF READMISSION: 12     CM met with patient and spouse at bedside to introduce self, explain role, complete CM assessment, and discuss DC planning  Patient alert and oriented and sitting in recliner  Lives where: [place]  Lives with: spouse, Nickie Brown  Supports: spouse, multiple daughters and their families live close by  Home Type & Entry: ranch home, [address]  No issues with navigating his home  DME: patient has multiple canes at home  He also has a RW and BSC   ADLs: patient reports he is independent but slower as the years go by  VNA history:  hx 8 years ago after breaking a hip; unsure of agency name  STR history: none  Pharmacy Preference & Coverage: [facility] for maintenance medications  [facility] Bullock on [address] [place] [address] for immediate/one time needs  Mental Health/Drug/ETOH history: no formal MH history  Patient reports he does sometimes feel depressed when he thinks about his age and medical needs  No substance use concerns  POA/AD/LW: patient identifies his daughter Eugenio Nino as his POA; also has AD  Income/Employment: SSI, pension and [employer]  Transportation: patient's family provides all transport and will do so upon discharge  PCP: Dr Toñito Friedman; normally sees every 3 months  Next appointment is in September but patient will likely reach out to see him sooner     DCP: CM reviewed PT evaluation and recommendation for STR  Patient stated that he has had bad balance for years and that this is "nothing new " With that said, he and spouse are receptive to [place] [address] services and are requesting PT/OT/ST at home  SLIM aware of and supportive of same  Patient and spouse do not have agency preference  As such, CM offered to send multiple local referrals to see which agencies can provide the requested therapies at home   Patient and spouse agree with this plan and would like to accept services with whoever can see him soonest  Referrals sent as requested via [facility]  CM will follow up re: accepting agency  CM reviewed discharge planning process including the following: identifying caregivers at home, preference for d/c planning needs,   availability of treatment team to discuss questions or concerns patient and/or family may have regarding diagnosis, plan of care, old or new medications and discharge planning   CM will continue to follow for care coordination and update assessment as appropriate

## 2021-06-20 NOTE — ASSESSMENT & PLAN NOTE
Reports dysphagia  Has seen ENT for this, got a CT soft tissue however results were unremarkable this regard but did show a stable RUL nodule  States that he has always had this, and now has learned that he can only have moist food and not dry food    Consider esophageal dysmotility as a differential   Plan:  See plan above

## 2021-06-20 NOTE — DISCHARGE INSTR - AVS FIRST PAGE
Dear Loretta Varela,     It was our pleasure to care for you here at [facility]  It is our hope that we were always able to exceed the expected standards for your care during your stay  You were hospitalized due to aspiration pneumonia  You were cared for on the 2nd floor by Leanne Alberto MD under the service of Rik Guajardo MD with the [address] [place] [address] [facility] who covers for your primary care physician (PCP), Artur Gregory MD, while you were hospitalized  If you have any questions or concerns related to this hospitalization, you may contact us at [phone]  For follow up as well as any medication refills, we recommend that you follow up with your primary care physician  A registered nurse will reach out to you by phone within a few days after your discharge to answer any additional questions that you may have after going home  However, at this time we provide for you here, the most important instructions / recommendations at discharge:     · Notable Medication Adjustments -   · Take Antibiotic for one more day  · Testing Required after Discharge -   · none  · Important follow up information -   · Follow with PCP  · Other Instructions -   · Stay hydrated and eat well  Speech therapy will follow with you in the outpatient setting  · Please review this entire after visit summary as additional general instructions including medication list, appointments, activity, diet, any pertinent wound care, and other additional recommendations from your care team that may be provided for you        Sincerely,     Leanne Alberto MD

## 2021-06-20 NOTE — DISCHARGE SUMMARY
[facility]  Discharge- Tavo Allan 1933, 80 y o  male MRN: [id number]  Unit/Bed#: S -01 Encounter: [id number]  Primary Care Provider: Danny Cordova MD   Date and time admitted to hospital: 6/17/2021 10:21 AM    * Aspiration pneumonia [facility])  Assessment & Plan  Presents with fever cough not feeling well  x-ray suggestive of possible left-sided pneumonia  Procalcitonin 0 5 --> 0 3, COVID negative  Legionella, strep negative   Blood cultures -24 hours    Repeat chest x-ray - left lower lobe pneumonia  Weaned off oxygen  Plan  · IV Unasyn day 4, will treat with keflex for 1 more day to complete 5 day treatment  · Appreciate speech eval recommendation: Dysphagia level 3- soft to chew/dental soft,  Nectar thick liquids, Pt to take PO/Meds only when fully alert and upright  Slow rate, small bites/sips, alternate between liquids and solids  · Discussed with the patient speech eval results, patient is not agreeable with thickened liquids would like to stick with thin liquids, patient understand risks of recurrent aspiration pneumonia, patient understand this risk  · Family at bedside adamant he stick to a recommended diet  Asked to get a list from speech therapy  Discussed with Karol White over the phone and explained to patient and family  Ambulatory dysfunction  Assessment & Plan  Patient usually ambulates with a cane presently with generalized weakness ambulating with walker  Felling better now  · Physical therapy/ occupational therapy evaluation - STR recommended, patient wanting Home with [facility] working on getting this arranged  Dysphagia  Assessment & Plan  Reports dysphagia  Has seen ENT for this, got a CT soft tissue however results were unremarkable this regard but did show a stable RUL nodule  States that he has always had this, and now has learned that he can only have moist food and not dry food    Consider esophageal dysmotility as a differential   Plan:  See plan above    Anxiety  Assessment & Plan  Continue Zoloft    Hypertension  Assessment & Plan  Continue doxazosin    Dyslipidemia  Assessment & Plan  · Continue statin      Discharging Resident Physician: Aden Stone MD  Attending: Chelsi Tang MD  PCP: Janet Garcia MD  Admission Date: 6/17/2021  Discharge Date: 06/20/21    Disposition:     Home with [facility]    Reason for Admission: SOB    Consultations During Hospital Stay:  · none    Procedures Performed:     · none    Significant Findings / Test Results:     XR chest portable  Result Date: 6/20/2021  Impression: Persistent pleuroparenchymal opacity at the left base with elevated left hemidiaphragm consistent with pneumonia/atelectasis and possibly a small effusion  Workstation performed: [id number]     XR chest 1 view portable  Result Date: 6/18/2021  Impression: Elevated left diaphragm with increased left basilar opacity suggesting atelectasis or possibly infiltrate  Correlate clinically  Workstation performed: [id number]       Incidental Findings:   · none     Test Results Pending at Discharge (will require follow up):   · none     Outpatient Tests Requested:  · none    Complications:  none    Hospital Course:     Toya Tran is a 80 y o  male patient who originally presented to the hospital on 6/17/2021 due to dyspnea and ambulatory dysfunction  H&P - presents with not feeling well, fever nausea  Patient was evaluated for these symptoms at an urgent care center couple of days back and placed on ciprofloxacin  Daughter at bedside reports he has taken so far 3 tablets of ciprofloxacin  He continues to feel weak fatigued with episodes of diarrhea hence he presents to the hospital   He noticed fever couple of days back, he has history of dysphagia and reports he chokes on food  No history of cough  Reports feeling short of breath  On inquiry he does report feeling anxious at the moment    Reports watery diarrhea multiple episodes since today    No history of prior antibiotic use prior to 3 dose of ciprofloxacin  No history of C diff in the past   No history of hematochezia hematemesis  Denies abdominal pain  His appetite is fair to good    Usually ambulates with a cane however for the last few days since the acute febrile illness he reports he is feeling very weak and needs walker to ambulate     He reports increased urinary frequency however denies history of dysuria hematuria flank pain  Patient treated for aspiration pneumonia with IV Unasyn transitioned to oral Keflex to complete a 5 day course  Physical therapy recommended ST are however patient would like to go home  Speech therapy did see him, recommended a modified diet but patient would like to have a regular diet with nectar thick modification  Discharged home with [facility] and speech therapy visit at home  Condition at Discharge: good     Discharge Day Visit / Exam:     Subjective:  Patient complaining of globus sensation in throat, says this is cleared by warm water gargles at home  Resolved with ocean spray  Eating and drinking well  Vitals: Blood Pressure: 98/52 (06/20/21 1148)  Pulse: 80 (06/20/21 1148)  Temperature: 97 7 °F (36 5 °C) (06/20/21 1148)  Temp Source: Oral (06/19/21 1200)  Respirations: 18 (06/19/21 2238)  Height: 5' 4" (162 6 cm) (06/17/21 1546)  Weight - Scale: 71 kg (156 lb 8 4 oz) (06/17/21 1546)  SpO2: 94 % (06/20/21 1148)  Exam:   Physical Exam  Vitals and nursing note reviewed  Constitutional:       General: He is not in acute distress  Appearance: Normal appearance  He is well-developed  He is obese  He is not ill-appearing or diaphoretic  Comments: malnourished   HENT:      Head: Normocephalic and atraumatic  Cardiovascular:      Rate and Rhythm: Normal rate and regular rhythm  Pulses: Normal pulses  Radial pulses are 2+ on the right side and 2+ on the left side  Heart sounds: Normal heart sounds     Pulmonary:      Effort: Pulmonary effort is normal       Breath sounds: Examination of the right-lower field reveals rales  Examination of the left-lower field reveals rales  Rales present  No wheezing or rhonchi  Abdominal:      General: Bowel sounds are normal  There is no distension  Palpations: Abdomen is soft  Tenderness: There is no abdominal tenderness  Musculoskeletal:         General: No tenderness  Right lower leg: No edema  Left lower leg: No edema  Skin:     General: Skin is warm and dry  Neurological:      General: No focal deficit present  Mental Status: He is alert and oriented to person, place, and time  Mental status is at baseline  Psychiatric:         Mood and Affect: Mood normal          Behavior: Behavior normal          Thought Content: Thought content normal          Judgment: Judgment normal        Discussion with Family: daughter and wife at [facility]    Discharge instructions/Information to patient and family:   See after visit summary for information provided to patient and family  Provisions for Follow-Up Care:  See after visit summary for information related to follow-up care and any pertinent home health orders  Planned Readmission: no     Discharge Medications:  See after visit summary for reconciled discharge medications provided to patient and family        ** Please Note: This note has been constructed using a voice recognition system **

## 2021-06-22 LAB
BACTERIA BLD CULT: NORMAL
BACTERIA BLD CULT: NORMAL

## 2021-06-24 ENCOUNTER — DOCUMENTATION (OUTPATIENT)
Dept: SOCIAL WORK | Facility: HOSPITAL | Age: 86
End: 2021-06-24

## 2021-06-24 NOTE — PROGRESS NOTES
Admission Report at [facility] has admitted your patient to [address] [place] [address] service with the following disciplines:      PT OT  SLP  This report is informational only, no responses is needed    TC to PCP Dr Francisco J Pressley on 6 22 2021  Primary focus of home health care amb dysfunction  Reduced endurance  Patient stated goals of care to walk better with cane and get out of the house  Anticipated visit pattern   2 x week for 4 weeks  Significant clinical findings    Small skin tear R shank  Left DAVID  Monitoring  Drop foot B LEs   AFO consult recommended to  via TC    Not compliant with thickened liquids  Continuing to edu in need SLP consult in system  eval this week    Request for additional disciplines Orthotic consult as previously requested    Request for medication clarification   na  Request for other order clarification   Na    Potential barriers to goal achievement  Compliance with diet    high fall risk    Thank you for allowing us to participate in the care of your patient        Haresh Willett, 65 [address] With complaints of scrotal pain 8/10, medicated as scheduled.

## 2021-09-20 ENCOUNTER — OFFICE VISIT (OUTPATIENT)
Dept: DERMATOLOGY | Facility: CLINIC | Age: 86
End: 2021-09-20
Payer: COMMERCIAL

## 2021-09-20 VITALS — HEIGHT: 66 IN | WEIGHT: 145 LBS | BODY MASS INDEX: 23.3 KG/M2 | TEMPERATURE: 96.8 F

## 2021-09-20 DIAGNOSIS — I87.2 VENOUS STASIS DERMATITIS OF BOTH LOWER EXTREMITIES: Primary | ICD-10-CM

## 2021-09-20 PROCEDURE — 99203 OFFICE O/P NEW LOW 30 MIN: CPT | Performed by: DERMATOLOGY

## 2021-09-20 RX ORDER — TRIAMCINOLONE ACETONIDE 1 MG/G
CREAM TOPICAL 2 TIMES DAILY
COMMUNITY

## 2021-10-05 ENCOUNTER — HOSPITAL ENCOUNTER (OUTPATIENT)
Dept: RADIOLOGY | Facility: HOSPITAL | Age: 86
Discharge: HOME | End: 2021-10-05
Payer: COMMERCIAL

## 2021-10-05 DIAGNOSIS — J18.9 UNRESOLVED PNEUMONIA: ICD-10-CM

## 2021-10-05 PROCEDURE — 74230 X-RAY XM SWLNG FUNCJ C+: CPT

## 2021-10-05 PROCEDURE — 92611 MOTION FLUOROSCOPY/SWALLOW: CPT

## 2021-12-20 ENCOUNTER — HOSPITAL ENCOUNTER (OUTPATIENT)
Dept: RADIOLOGY | Facility: MEDICAL CENTER | Age: 86
Discharge: HOME | End: 2021-12-20
Payer: COMMERCIAL

## 2021-12-20 DIAGNOSIS — R91.8 GROUND GLASS OPACITY PRESENT ON IMAGING OF LUNG: ICD-10-CM

## 2021-12-20 PROCEDURE — 71250 CT THORAX DX C-: CPT

## 2021-12-20 PROCEDURE — G1004 CDSM NDSC: HCPCS

## 2022-03-11 ENCOUNTER — APPOINTMENT (OUTPATIENT)
Dept: LAB | Facility: MEDICAL CENTER | Age: 87
End: 2022-03-11
Payer: COMMERCIAL

## 2022-03-11 DIAGNOSIS — D64.9 ANEMIA, UNSPECIFIED TYPE: ICD-10-CM

## 2022-03-11 DIAGNOSIS — I25.10 ATHEROSCLEROSIS OF NATIVE CORONARY ARTERY, UNSPECIFIED WHETHER ANGINA PRESENT, UNSPECIFIED WHETHER NATIVE OR TRANSPLANTED HEART: ICD-10-CM

## 2022-03-11 LAB
ANION GAP SERPL CALCULATED.3IONS-SCNC: 0 MMOL/L (ref 4–13)
BUN SERPL-MCNC: 17 MG/DL (ref 5–25)
CALCIUM SERPL-MCNC: 9.2 MG/DL (ref 8.3–10.1)
CHLORIDE SERPL-SCNC: 106 MMOL/L (ref 100–108)
CO2 SERPL-SCNC: 31 MMOL/L (ref 21–32)
CREAT SERPL-MCNC: 0.8 MG/DL (ref 0.6–1.3)
ERYTHROCYTE [DISTWIDTH] IN BLOOD BY AUTOMATED COUNT: 12.6 % (ref 11.6–15.1)
GFR SERPL CREATININE-BSD FRML MDRD: 79 ML/MIN/1.73SQ M
GLUCOSE SERPL-MCNC: 136 MG/DL (ref 65–140)
HCT VFR BLD AUTO: 42.3 % (ref 36.5–49.3)
HGB BLD-MCNC: 14.5 G/DL (ref 12–17)
MCH RBC QN AUTO: 30.9 PG (ref 26.8–34.3)
MCHC RBC AUTO-ENTMCNC: 34.3 G/DL (ref 31.4–37.4)
MCV RBC AUTO: 90 FL (ref 82–98)
PLATELET # BLD AUTO: 160 THOUSANDS/UL (ref 149–390)
PMV BLD AUTO: 10.8 FL (ref 8.9–12.7)
POTASSIUM SERPL-SCNC: 4.2 MMOL/L (ref 3.5–5.3)
RBC # BLD AUTO: 4.69 MILLION/UL (ref 3.88–5.62)
SODIUM SERPL-SCNC: 137 MMOL/L (ref 136–145)
WBC # BLD AUTO: 5.1 THOUSAND/UL (ref 4.31–10.16)

## 2022-03-11 PROCEDURE — 85027 COMPLETE CBC AUTOMATED: CPT

## 2022-03-11 PROCEDURE — 80048 BASIC METABOLIC PNL TOTAL CA: CPT

## 2022-03-11 PROCEDURE — 36415 COLL VENOUS BLD VENIPUNCTURE: CPT

## 2022-03-31 ENCOUNTER — APPOINTMENT (OUTPATIENT)
Dept: LAB | Facility: MEDICAL CENTER | Age: 87
End: 2022-03-31
Payer: COMMERCIAL

## 2022-03-31 DIAGNOSIS — E11.9 DIABETES MELLITUS WITHOUT COMPLICATION (HCC): ICD-10-CM

## 2022-03-31 LAB
EST. AVERAGE GLUCOSE BLD GHB EST-MCNC: 120 MG/DL
HBA1C MFR BLD: 5.8 %

## 2022-03-31 PROCEDURE — 36415 COLL VENOUS BLD VENIPUNCTURE: CPT

## 2022-03-31 PROCEDURE — 83036 HEMOGLOBIN GLYCOSYLATED A1C: CPT

## 2022-06-10 ENCOUNTER — APPOINTMENT (OUTPATIENT)
Dept: RADIOLOGY | Facility: MEDICAL CENTER | Age: 87
End: 2022-06-10
Payer: COMMERCIAL

## 2022-06-10 DIAGNOSIS — M50.30 DDD (DEGENERATIVE DISC DISEASE), CERVICAL: ICD-10-CM

## 2022-06-10 DIAGNOSIS — M51.36 DEGENERATION OF LUMBAR INTERVERTEBRAL DISC: ICD-10-CM

## 2022-06-10 DIAGNOSIS — M51.36 DDD (DEGENERATIVE DISC DISEASE), LUMBAR: ICD-10-CM

## 2022-06-10 DIAGNOSIS — M46.92 CERVICAL SPONDYLITIS (HCC): ICD-10-CM

## 2022-06-10 PROCEDURE — 72050 X-RAY EXAM NECK SPINE 4/5VWS: CPT

## 2022-06-13 ENCOUNTER — EVALUATION (OUTPATIENT)
Dept: PHYSICAL THERAPY | Facility: MEDICAL CENTER | Age: 87
End: 2022-06-13
Payer: COMMERCIAL

## 2022-06-13 DIAGNOSIS — M46.92 CERVICAL SPONDYLITIS (HCC): Primary | ICD-10-CM

## 2022-06-13 DIAGNOSIS — M54.2 CERVICALGIA: ICD-10-CM

## 2022-06-13 PROCEDURE — 97161 PT EVAL LOW COMPLEX 20 MIN: CPT | Performed by: PHYSICAL THERAPIST

## 2022-06-13 NOTE — PROGRESS NOTES
PT Evaluation     Today's date: 2022  Patient name: Janine Ayon  : 1933  MRN: [id number]  Referring provider: Jose Gramajo MD  Dx:   Encounter Diagnosis     ICD-10-CM    1  Cervical spondylitis (HCC)  M46 92    2  Cervicalgia  M54 2                   Assessment  Assessment details: Patient is an 79 y/o male who presents with complaints of pain in the cervical region that radiates up the back of the right side of his head  No further referral appears necessary at this time based upon examination results  Patient presents with the following impairments: decreased range of motion, decreased strength and decreased ability to perform functional tasks such as turning head  Prognosis is good given HEP compliance and PT 2x/wk tapering to 1x/wk over the next 4-6 weeks  Positive prognostic indicators include positive attitude toward recovery  Negative prognostic indicators include   Please contact me if you have any questions or recommendations  Thank you for the opportunity to share in Jorge's care  Impairments: abnormal muscle tone, abnormal or restricted ROM, activity intolerance, lacks appropriate home exercise program, pain with function and poor posture     Goals  STG  Decrease pain by 50% in 4 weeks  Increase range of motion by 10 degrees in 4 weeks  LTG  Patient will be independent in hep in 4 weeks  Patient will be able to perform adls at plof by D/C  Patient will be able to perform turning head at plof by D/C      Plan  Patient would benefit from: skilled physical therapy  Referral necessary: No  Planned modality interventions: thermotherapy: hydrocollator packs  Planned therapy interventions: manual therapy, massage, neuromuscular re-education, patient education, postural training, strengthening, stretching, therapeutic activities, flexibility, functional ROM exercises and home exercise program  Frequency: 2x week  Duration in weeks: 6  Plan of Care beginning date: 2022  Plan of Care expiration date: 2022  Treatment plan discussed with: patient        Subjective Evaluation    History of Present Illness  Mechanism of injury: Patient reports about a year ago he started to complain about neck pain  Doctor attributing it to muscle pain  Patient notes pain is traveling up the back of his neck into his head on the right side  Patient notes falling in March (he believes) and had a normal CT scan of his head at a [facility] location  More recently had x-rays of his neck- awaiting results  Patient notes he will see the doctor on Friday to follow up  Was given some neck movements and stretches for the time being  Pain  Current pain ratin  At best pain ratin  At worst pain ratin  Quality: sharp and radiating  Relieving factors: rest    Treatments  No previous or current treatments  Patient Goals  Patient goals for therapy: decreased pain and increased motion          Objective     Static Posture     Shoulders  Depressed and rounded  Palpation     Right   Hypertonic in the cervical paraspinals, levator scapulae, suboccipitals and upper trapezius  Muscle spasm in the cervical paraspinals, levator scapulae and upper trapezius       Additional Palpation Details  Not tender today- notes typically tender to touch    Active Range of Motion   Cervical/Thoracic Spine       Cervical    Flexion: 35 degrees   Extension: 35 degrees      Left lateral flexion: 10 degrees      Right lateral flexion: 10 degrees      Left rotation: 30 degrees  Right rotation: 35 degrees             Strength/Myotome Testing     Left Shoulder   Normal muscle strength    Right Shoulder   Normal muscle strength                Precautions HTN, fall risk        Manuals        STM R paraspinal                                Neuro Re-Ed                 RTB rows        RTB ext                scap retraction                        Ther Ex        pulleys        Cervical rot str        Lev str        UT str Ther Activity                        Gait Training                        Modalities

## 2022-06-13 NOTE — LETTER
2022    Aranza Marie MD  [address]    Patient: Sharyn Cheng   YOB: 1933   Date of Visit: 2022     Encounter Diagnosis     ICD-10-CM    1  Cervical spondylitis (HCC)  M46 92    2  Cervicalgia  M54 2        Dear Dr Torres Ba:    Thank you for your recent referral of Sharyn Cheng  Please review the attached evaluation summary from Jorge's recent visit  Please verify that you agree with the plan of care by signing the attached order  If you have any questions or concerns, please do not hesitate to call  I sincerely appreciate the opportunity to share in the care of one of your patients and hope to have another opportunity to work with you in the near future  Sincerely,    Elizabeth Bunch, PT      Referring Provider:      I certify that I have read the below Plan of Care and certify the need for these services furnished under this plan of treatment while under my care  Aranza Marie MD  [address] [facility] [address]  Via Fax: [phone]          PT Evaluation     Today's date: 2022  Patient name: Sharyn Cheng  : 1933  MRN: [id number]  Referring provider: Jamie Osborne MD  Dx:   Encounter Diagnosis     ICD-10-CM    1  Cervical spondylitis (HCC)  M46 92    2  Cervicalgia  M54 2                   Assessment  Assessment details: Patient is an 81 y/o male who presents with complaints of pain in the cervical region that radiates up the back of the right side of his head  No further referral appears necessary at this time based upon examination results  Patient presents with the following impairments: decreased range of motion, decreased strength and decreased ability to perform functional tasks such as turning head  Prognosis is good given HEP compliance and PT 2x/wk tapering to 1x/wk over the next 4-6 weeks  Positive prognostic indicators include positive attitude toward recovery    Negative prognostic indicators include   Please contact me if you have any questions or recommendations  Thank you for the opportunity to share in Jorge's care  Impairments: abnormal muscle tone, abnormal or restricted ROM, activity intolerance, lacks appropriate home exercise program, pain with function and poor posture     Goals  STG  Decrease pain by 50% in 4 weeks  Increase range of motion by 10 degrees in 4 weeks  LTG  Patient will be independent in hep in 4 weeks  Patient will be able to perform adls at plof by D/C  Patient will be able to perform turning head at plof by D/C  Plan  Patient would benefit from: skilled physical therapy  Referral necessary: No  Planned modality interventions: thermotherapy: hydrocollator packs  Planned therapy interventions: manual therapy, massage, neuromuscular re-education, patient education, postural training, strengthening, stretching, therapeutic activities, flexibility, functional ROM exercises and home exercise program  Frequency: 2x week  Duration in weeks: 6  Plan of Care beginning date: 2022  Plan of Care expiration date: 2022  Treatment plan discussed with: patient        Subjective Evaluation    History of Present Illness  Mechanism of injury: Patient reports about a year ago he started to complain about neck pain  Doctor attributing it to muscle pain  Patient notes pain is traveling up the back of his neck into his head on the right side  Patient notes falling in March (he believes) and had a normal CT scan of his head at a [facility] location  More recently had x-rays of his neck- awaiting results  Patient notes he will see the doctor on Friday to follow up  Was given some neck movements and stretches for the time being        Pain  Current pain ratin  At best pain ratin  At worst pain ratin  Quality: sharp and radiating  Relieving factors: rest    Treatments  No previous or current treatments  Patient Goals  Patient goals for therapy: decreased pain and increased motion          Objective     Static Posture     Shoulders  Depressed and rounded  Palpation     Right   Hypertonic in the cervical paraspinals, levator scapulae, suboccipitals and upper trapezius  Muscle spasm in the cervical paraspinals, levator scapulae and upper trapezius       Additional Palpation Details  Not tender today- notes typically tender to touch    Active Range of Motion   Cervical/Thoracic Spine       Cervical    Flexion: 35 degrees   Extension: 35 degrees      Left lateral flexion: 10 degrees      Right lateral flexion: 10 degrees      Left rotation: 30 degrees  Right rotation: 35 degrees             Strength/Myotome Testing     Left Shoulder   Normal muscle strength    Right Shoulder   Normal muscle strength                Precautions HTN, fall risk        Manuals        STM R paraspinal                                Neuro Re-Ed                 RTB rows        RTB ext                scap retraction                        Ther Ex        pulleys        Cervical rot str        Darreld Kras        UT str                                        Ther Activity                        Gait Training                        Modalities

## 2022-06-15 ENCOUNTER — OFFICE VISIT (OUTPATIENT)
Dept: PHYSICAL THERAPY | Facility: MEDICAL CENTER | Age: 87
End: 2022-06-15
Payer: COMMERCIAL

## 2022-06-15 DIAGNOSIS — M46.92 CERVICAL SPONDYLITIS (HCC): Primary | ICD-10-CM

## 2022-06-15 DIAGNOSIS — M54.2 CERVICALGIA: ICD-10-CM

## 2022-06-15 PROCEDURE — 97140 MANUAL THERAPY 1/> REGIONS: CPT | Performed by: PHYSICAL THERAPIST

## 2022-06-15 PROCEDURE — 97110 THERAPEUTIC EXERCISES: CPT | Performed by: PHYSICAL THERAPIST

## 2022-06-15 PROCEDURE — 97112 NEUROMUSCULAR REEDUCATION: CPT | Performed by: PHYSICAL THERAPIST

## 2022-06-15 NOTE — PROGRESS NOTES
Daily Note     Today's date: 6/15/2022  Patient name: Lit Monae  : 1933  MRN: [id number]  Referring provider: Du Cotton MD  Dx:   Encounter Diagnosis     ICD-10-CM    1  Cervical spondylitis (HCC)  M46 92    2  Cervicalgia  M54 2                   Subjective: Patient offers no new complaints  States icy hot helps  Objective: See treatment diary below      Assessment: Tolerated treatment fair  Patient demonstrated fatigue post treatment and would benefit from continued PT      Plan: Continue per plan of care  Progress treatment as tolerated         Precautions HTN, fall risk        Manuals 6/15       STM R paraspinal AK                               Neuro Re-Ed                 RTB rows 20x       RTB ext 20x               scap retraction 3" 20x                       Ther Ex        pulleys 5 min       Cervical rot str 10" 5x ea       Lev str        UT str 10" 5x ea       Wall slides 10"10x       Cervical retraction  5" 15x                       Ther Activity                        Gait Training                        Modalities

## 2022-06-20 ENCOUNTER — OFFICE VISIT (OUTPATIENT)
Dept: PHYSICAL THERAPY | Facility: MEDICAL CENTER | Age: 87
End: 2022-06-20
Payer: COMMERCIAL

## 2022-06-20 DIAGNOSIS — M54.2 CERVICALGIA: ICD-10-CM

## 2022-06-20 DIAGNOSIS — M46.92 CERVICAL SPONDYLITIS (HCC): Primary | ICD-10-CM

## 2022-06-20 PROCEDURE — 97110 THERAPEUTIC EXERCISES: CPT | Performed by: PHYSICAL THERAPIST

## 2022-06-20 PROCEDURE — 97112 NEUROMUSCULAR REEDUCATION: CPT | Performed by: PHYSICAL THERAPIST

## 2022-06-20 PROCEDURE — 97140 MANUAL THERAPY 1/> REGIONS: CPT | Performed by: PHYSICAL THERAPIST

## 2022-06-20 NOTE — PROGRESS NOTES
Daily Note     Today's date: 2022  Patient name: Sharyn Cheng  : 1933  MRN: [id number]  Referring provider: Jamie Osborne MD  Dx:   Encounter Diagnosis     ICD-10-CM    1  Cervical spondylitis (HCC)  M46 92    2  Cervicalgia  M54 2                   Subjective: Patient notes he has a form from the doctor describing the nerve issues he is having but he forgot to bring it- will bring Thursday  Objective: See treatment diary below      Assessment: Tolerated treatment fair  Patient demonstrated fatigue post treatment and would benefit from continued PT  Cueing for stretches to maintain proper technique  Plan: Continue per plan of care  Progress treatment as tolerated         Precautions HTN, fall risk        Manuals 6/15 6/20      STM R paraspinal AK AK                              Neuro Re-Ed                 RTB rows 20x 20x      RTB ext 20x 20x              scap retraction 3" 20x 3"20x                      Ther Ex        pulleys 5 min 5 min      Cervical rot str 10" 5x ea 10" 5x ea      Lev str  10" 5x ea      UT str 10" 5x ea 10" 5x ea      Wall slides 10"10x 10"10x      Cervical retraction  5" 15x 3" 15x                      Ther Activity                        Gait Training                        Modalities

## 2022-06-23 ENCOUNTER — OFFICE VISIT (OUTPATIENT)
Dept: PHYSICAL THERAPY | Facility: MEDICAL CENTER | Age: 87
End: 2022-06-23
Payer: COMMERCIAL

## 2022-06-23 DIAGNOSIS — M54.2 CERVICALGIA: ICD-10-CM

## 2022-06-23 DIAGNOSIS — M46.92 CERVICAL SPONDYLITIS (HCC): Primary | ICD-10-CM

## 2022-06-23 PROCEDURE — 97110 THERAPEUTIC EXERCISES: CPT | Performed by: PHYSICAL THERAPIST

## 2022-06-23 PROCEDURE — 97140 MANUAL THERAPY 1/> REGIONS: CPT | Performed by: PHYSICAL THERAPIST

## 2022-06-23 NOTE — PROGRESS NOTES
Daily Note     Today's date: 2022  Patient name: Lit Monae  : 1933  MRN: [id number]  Referring provider: Du Cotton MD  Dx:   Encounter Diagnosis     ICD-10-CM    1  Cervical spondylitis (HCC)  M46 92    2  Cervicalgia  M54 2                   Subjective: Patient notes back of head still hurting intermittently  Objective: See treatment diary below      Assessment: Tolerated treatment fair  Patient demonstrated fatigue post treatment and would benefit from continued PT  Cueing for stretches to maintain proper technique continued  Plan: Continue per plan of care  Progress treatment as tolerated         Precautions HTN, fall risk        Manuals 6/15 6/20 6/23     STM R paraspinal AK AK AK                             Neuro Re-Ed                 RTB rows 20x 20x 20x     RTB ext 20x 20x 20x             scap retraction 3" 20x 3"20x 3"20x                     Ther Ex        pulleys 5 min 5 min 5 min     Cervical rot str 10" 5x ea 10" 5x ea 15" 5x     Lev str  10" 5x ea 15" 5x     UT str 10" 5x ea 10" 5x ea 15" 5x     Wall slides 10"10x 10"10x      Cervical retraction  5" 15x 3" 15x 3" 20x                     Ther Activity                        Gait Training                        Modalities

## 2022-06-27 ENCOUNTER — OFFICE VISIT (OUTPATIENT)
Dept: PHYSICAL THERAPY | Facility: MEDICAL CENTER | Age: 87
End: 2022-06-27
Payer: COMMERCIAL

## 2022-06-27 DIAGNOSIS — M46.92 CERVICAL SPONDYLITIS (HCC): Primary | ICD-10-CM

## 2022-06-27 DIAGNOSIS — M54.2 CERVICALGIA: ICD-10-CM

## 2022-06-27 PROCEDURE — 97140 MANUAL THERAPY 1/> REGIONS: CPT | Performed by: PHYSICAL THERAPIST

## 2022-06-27 PROCEDURE — 97110 THERAPEUTIC EXERCISES: CPT | Performed by: PHYSICAL THERAPIST

## 2022-06-27 NOTE — PROGRESS NOTES
Daily Note     Today's date: 2022  Patient name: Santi Araiza  : 1933  MRN: [id number]  Referring provider: Manuel Reynolds MD  Dx:   Encounter Diagnosis     ICD-10-CM    1  Cervical spondylitis (HCC)  M46 92    2  Cervicalgia  M54 2                   Subjective: Patient noting no better  Seeing the doctor on Friday  Objective: See treatment diary below      Assessment: Tolerated treatment fair  Patient demonstrated fatigue post treatment and would benefit from continued PT  Cueing continues to be necessary  Plan: Continue per plan of care  Progress treatment as tolerated         Precautions HTN, fall risk        Manuals 6/15 6/20 6/23 6/27    STM R paraspinal AK AK AK AK                            Neuro Re-Ed                 RTB rows 20x 20x 20x GTB 3" 20x    RTB ext 20x 20x 20x GTB 3" 20x            scap retraction 3" 20x 3"20x 3"20x 3" 20x                    Ther Ex        pulleys 5 min 5 min 5 min 5 min    Cervical rot str 10" 5x ea 10" 5x ea 15" 5x 15" 5x e    Lev str  10" 5x ea 15" 5x 15" 5x ea    UT str 10" 5x ea 10" 5x ea 15" 5x 15" 5x ea    Wall slides 10"10x 10"10x      Cervical retraction  5" 15x 3" 15x 3" 20x 3" 20x                    Ther Activity                        Gait Training                        Modalities

## 2022-06-30 ENCOUNTER — APPOINTMENT (OUTPATIENT)
Dept: PHYSICAL THERAPY | Facility: MEDICAL CENTER | Age: 87
End: 2022-06-30
Payer: COMMERCIAL

## 2022-07-07 ENCOUNTER — APPOINTMENT (OUTPATIENT)
Dept: LAB | Facility: MEDICAL CENTER | Age: 87
End: 2022-07-07
Payer: COMMERCIAL

## 2022-10-12 PROBLEM — J69.0 ASPIRATION PNEUMONIA (HCC): Status: RESOLVED | Noted: 2021-06-17 | Resolved: 2022-10-12

## 2023-01-18 ENCOUNTER — APPOINTMENT (OUTPATIENT)
Dept: LAB | Facility: MEDICAL CENTER | Age: 88
End: 2023-01-18

## 2023-05-16 ENCOUNTER — APPOINTMENT (OUTPATIENT)
Dept: LAB | Facility: MEDICAL CENTER | Age: 88
End: 2023-05-16

## 2023-06-22 ENCOUNTER — OFFICE VISIT (OUTPATIENT)
Dept: CARDIOLOGY CLINIC | Facility: MEDICAL CENTER | Age: 88
End: 2023-06-22
Payer: COMMERCIAL

## 2023-06-22 VITALS
HEART RATE: 80 BPM | BODY MASS INDEX: 23.95 KG/M2 | DIASTOLIC BLOOD PRESSURE: 80 MMHG | WEIGHT: 149 LBS | OXYGEN SATURATION: 94 % | SYSTOLIC BLOOD PRESSURE: 124 MMHG | HEIGHT: 66 IN

## 2023-06-22 DIAGNOSIS — I25.10 CORONARY ARTERY DISEASE INVOLVING NATIVE CORONARY ARTERY OF NATIVE HEART WITHOUT ANGINA PECTORIS: Primary | ICD-10-CM

## 2023-06-22 DIAGNOSIS — I10 PRIMARY HYPERTENSION: ICD-10-CM

## 2023-06-22 DIAGNOSIS — R06.02 SHORTNESS OF BREATH: ICD-10-CM

## 2023-06-22 DIAGNOSIS — R53.83 OTHER FATIGUE: ICD-10-CM

## 2023-06-22 PROCEDURE — 99203 OFFICE O/P NEW LOW 30 MIN: CPT | Performed by: INTERNAL MEDICINE

## 2023-06-22 PROCEDURE — 93000 ELECTROCARDIOGRAM COMPLETE: CPT | Performed by: INTERNAL MEDICINE

## 2023-06-22 RX ORDER — PREDNISONE 5 MG/1
TABLET ORAL
COMMUNITY
Start: 2023-04-28

## 2023-06-22 NOTE — PROGRESS NOTES
[facility] [address] [address]                                            Cardiology Office Consult  Corrie Farrell, 80 y o  male  YOB: 1933  MRN: [id number] Encounter: [id number]      PCP - Hesham Emanuel MD  Referring Provider - Emma Xiao MD    Chief Complaint   Patient presents with   • New Patient Visit     Referred to establish with Cardiologist   • Fatigue     Patient states more tired than usual       Assessment  Coronary artery disease  [facility] - 8/18/2017 [facility]) - LM 40-50%, pLAD 70%, mLAD/D2 60%, RCA - mild disease  Hypertension  Fatigue  Ambulatory dysfunction    Prior cardiologist - [address] [place] [address])    Plan  Coronary artery disease, Fatigue  Overview  He previously had a cardiac catheterization at [facility] in 2017, where he had 61 to 70% stenosis, but did not need any intervention  No recent complaints of chest pain or shortness of breath  He has significant ambulatory dysfunction and is limited mainly by leg pain/MSK problems for ambulation (walks 75 ft on driveway)  He reports shortness of breath with bending down, which is stable for the past 2-3 year  Impression  Stable CAD, asymptomatic  Plan  Continue aspirin, atorvastatin  Optimize lipids  Check echocardiogram  I discussed stress testing to follow-up on the CAD, if he were having any symptoms of chest pain or shortness of breath - but he and his wife feel that he is doing well otherwise, and does not have any chest pain or other ischemic symptoms    Hypertension  Well-controlled, 124/80  On doxazosin for BPH / HTN  Follow up with PCP    Results for orders placed or performed in visit on 06/22/23   POCT ECG    Impression    Normal sinus rhythm  Normal ECG       Orders Placed This Encounter   Procedures   • POCT ECG   • Echo complete w/ contrast if indicated     Return in about 1 year (around 6/22/2024), or if symptoms worsen or fail to improve  History of Present Illness   80 y o  male comes in as a new patient for consultation and establishment of care regarding fatigue and prior history of CAD  He previously saw a cardiologist at [facility] in January 2020, but is now reestablishing care with   [facility]  He has had longstanding history of shortness of breath, mainly with activity like bending down, and had undergone cardiac catheterization at [facility] in August 2017  This revealed moderate 60-70% stenosis of LAD/D1-D2 and was medically managed  He has been on aspirin and statin since then and has not had any major problems related to same  He denies any complaints of chest pain or any recent progression in his shortness of breath  He continues to be able to walk 75 feet on his driveway, but is mainly limited by leg and knee problems  Historical Information   Past Medical History:   Diagnosis Date   • Coronary artery disease    • Hyperlipidemia      Past Surgical History:   Procedure Laterality Date   • APPENDECTOMY     • TOTAL HIP ARTHROPLASTY       History reviewed  No pertinent family history    Current Outpatient Medications on File Prior to Visit   Medication Sig Dispense Refill   • ASPIRIN 81 PO Take 81 mg by mouth daily     • atorvastatin (LIPITOR) 10 mg tablet Take 10 mg by mouth daily     • benzonatate (TESSALON PERLES) 100 mg capsule Take 1 capsule (100 mg total) by mouth 3 (three) times a day as needed for cough 20 capsule 0   • doxazosin (CARDURA) 4 mg tablet      • gabapentin (NEURONTIN) 300 mg capsule Take 300 mg by mouth 2 (two) times a day 1 in am, 2 in PM     • guaiFENesin (MUCINEX) 600 mg 12 hr tablet Take 1 tablet (600 mg total) by mouth every 12 (twelve) hours as needed for cough or congestion 30 tablet 0   • LORazepam (ATIVAN) 1 mg tablet Take 1 mg by mouth daily at bedtime     • polyethylene glycol (GlycoLax) 17 GM/SCOOP powder Take 17 g by mouth daily     • predniSONE 5 mg tablet "  • sertraline (ZOLOFT) 50 mg tablet      • sodium chloride (OCEAN) 0 65 % nasal spray 1 spray into each nostril every hour as needed for congestion 15 mL 0   • triamcinolone (KENALOG) 0 1 % cream Apply topically 2 (two) times a day       No current facility-administered medications on file prior to visit  No Known Allergies  Social History     Socioeconomic History   • Marital status: /Civil Union     Spouse name: None   • Number of children: None   • Years of education: None   • Highest education level: None   Occupational History   • None   Tobacco Use   • Smoking status: Former   • Smokeless tobacco: Never   Vaping Use   • Vaping Use: Never used   Substance and Sexual Activity   • Alcohol use: Yes   • Drug use: Never   • Sexual activity: None   Other Topics Concern   • None   Social History Narrative   • None     Social Determinants of Health     Financial Resource Strain: Not on file   Food Insecurity: Not on file   Transportation Needs: Not on file   Physical Activity: Not on file   Stress: Not on file   Social Connections: Not on file   Intimate Partner Violence: Not on file   Housing Stability: Not on file        Review of Systems   All other systems reviewed and are negative  Vitals:  Vitals:    06/22/23 1440   BP: 124/80   BP Location: Left arm   Patient Position: Sitting   Cuff Size: Adult   Pulse: 80   SpO2: 94%   Weight: 67 6 kg (149 lb)   Height: 5' 6\" (1 676 m)     BMI - Body mass index is 24 05 kg/m²  Wt Readings from Last 7 Encounters:   06/22/23 67 6 kg (149 lb)   12/07/21 65 8 kg (145 lb)   09/20/21 65 8 kg (145 lb)   06/17/21 71 kg (156 lb 8 4 oz)   06/14/21 65 3 kg (144 lb)   06/07/21 64 9 kg (143 lb)   04/21/21 64 9 kg (143 lb)       Physical Exam  Vitals and nursing note reviewed  Constitutional:       General: He is not in acute distress  Appearance: Normal appearance  He is well-developed  He is not ill-appearing        Comments: Appears frail, walks with a cane   HENT: " Head: Normocephalic and atraumatic  Nose: No congestion  Eyes:      General: No scleral icterus  Conjunctiva/sclera: Conjunctivae normal    Neck:      Vascular: No carotid bruit or JVD  Cardiovascular:      Rate and Rhythm: Normal rate and regular rhythm  Heart sounds: Normal heart sounds  No murmur heard  No friction rub  No gallop  Pulmonary:      Effort: Pulmonary effort is normal  No respiratory distress  Breath sounds: Normal breath sounds  No wheezing or rales  Chest:      Chest wall: No tenderness  Abdominal:      General: There is no distension  Palpations: Abdomen is soft  Tenderness: There is no abdominal tenderness  Musculoskeletal:         General: No swelling, tenderness or deformity  Cervical back: Neck supple  No muscular tenderness  Right lower leg: No edema  Left lower leg: No edema  Skin:     General: Skin is warm  Findings: Bruising present  Comments: Left forearm bruising noted   Neurological:      General: No focal deficit present  Mental Status: He is alert and oriented to person, place, and time  Mental status is at baseline  Motor: Weakness present  Gait: Gait abnormal    Psychiatric:         Mood and Affect: Mood normal          Behavior: Behavior normal          Thought Content:  Thought content normal          Labs:  CBC:   Lab Results   Component Value Date    WBC 6 52 05/16/2023    RBC 4 82 05/16/2023    HGB 15 2 05/16/2023    HCT 45 3 05/16/2023    MCV 94 05/16/2023     05/16/2023    RDW 12 6 05/16/2023       CMP:   Lab Results   Component Value Date    K 3 8 05/16/2023     05/16/2023    CO2 32 05/16/2023    BUN 15 05/16/2023    CREATININE 0 76 05/16/2023    EGFR 80 05/16/2023    CALCIUM 9 3 05/16/2023    AST 23 05/16/2023    ALT 30 05/16/2023    ALKPHOS 68 06/18/2021       Magnesium:  Lab Results   Component Value Date    MG 1 8 06/20/2021       Lipid Profile:   Lab Results "  Component Value Date    HDL 69 05/16/2023    TRIG 112 05/16/2023    LDLCALC 87 05/16/2023       Thyroid Studies:   Lab Results   Component Value Date    NLB2YVPAZXZP 3 380 05/16/2023       A1c:  No components found for: \"HGA1C\"    INR:  Lab Results   Component Value Date    INR 1 11 06/17/2021   5    Imaging: No results found  Cardiac testing:   No results found for this or any previous visit  No results found for this or any previous visit  No results found for this or any previous visit  No results found for this or any previous visit  CAR US OUTSIDE IMAGES  1 [phone] [id number]  [phone]  [id number]         "

## 2023-09-26 ENCOUNTER — OFFICE VISIT (OUTPATIENT)
Dept: DERMATOLOGY | Facility: CLINIC | Age: 88
End: 2023-09-26
Payer: COMMERCIAL

## 2023-09-26 VITALS — HEIGHT: 66 IN | WEIGHT: 144 LBS | BODY MASS INDEX: 23.14 KG/M2 | TEMPERATURE: 97.8 F

## 2023-09-26 DIAGNOSIS — L85.3 XEROSIS OF SKIN: ICD-10-CM

## 2023-09-26 DIAGNOSIS — L82.1 SEBORRHEIC KERATOSIS: ICD-10-CM

## 2023-09-26 DIAGNOSIS — D18.01 CHERRY ANGIOMA: ICD-10-CM

## 2023-09-26 DIAGNOSIS — D22.9 MULTIPLE MELANOCYTIC NEVI: Primary | ICD-10-CM

## 2023-09-26 DIAGNOSIS — L81.4 LENTIGO: ICD-10-CM

## 2023-09-26 DIAGNOSIS — D48.9 NEOPLASM OF UNCERTAIN BEHAVIOR: ICD-10-CM

## 2023-09-26 DIAGNOSIS — L57.0 KERATOSIS, ACTINIC: ICD-10-CM

## 2023-09-26 PROCEDURE — 99214 OFFICE O/P EST MOD 30 MIN: CPT | Performed by: DERMATOLOGY

## 2023-09-26 PROCEDURE — 88305 TISSUE EXAM BY PATHOLOGIST: CPT | Performed by: STUDENT IN AN ORGANIZED HEALTH CARE EDUCATION/TRAINING PROGRAM

## 2023-09-26 PROCEDURE — 17003 DESTRUCT PREMALG LES 2-14: CPT | Performed by: DERMATOLOGY

## 2023-09-26 PROCEDURE — 17000 DESTRUCT PREMALG LESION: CPT | Performed by: DERMATOLOGY

## 2023-09-26 PROCEDURE — 11102 TANGNTL BX SKIN SINGLE LES: CPT | Performed by: DERMATOLOGY

## 2023-09-26 PROCEDURE — 88313 SPECIAL STAINS GROUP 2: CPT | Performed by: STUDENT IN AN ORGANIZED HEALTH CARE EDUCATION/TRAINING PROGRAM

## 2023-09-26 RX ORDER — GABAPENTIN 400 MG/1
CAPSULE ORAL
COMMUNITY
Start: 2023-09-14

## 2023-09-26 RX ORDER — SERTRALINE HYDROCHLORIDE 100 MG/1
TABLET, FILM COATED ORAL
COMMUNITY
Start: 2023-09-23

## 2023-09-26 NOTE — PROGRESS NOTES
[facility] Note     Patient Name: Marleny Silver  Encounter Date: 09/26/2023    Have you been cared for by a [facility]atologist in the last 3 years and, if so, which description applies to you? Yes. I have been here within the last 3 years, and my medical history has NOT changed since that time. I am FEMALE/of child-bearing potential.    REVIEW OF SYSTEMS:  Have you recently had or currently have any of the following? · No changes in my recent health. PAST MEDICAL HISTORY:  Have you personally ever had or currently have any of the following? If "YES," then please provide more detail. · No changes in my medical history. FAMILY HISTORY:  Any "first degree relatives" (parent, brother, sister, or child) with the following? • No changes in my family's known health. PATIENT EXPERIENCE:    • Do you want the Dermatologist to perform a COMPLETE skin exam today including a clinical examination under the "bra and underwear" areas? NO groin and buttocks npt examined   • If necessary, do we have your permission to call and leave a detailed message on your Preferred Phone number that includes your specific medical information?   Yes      No Known Allergies   Current Outpatient Medications:   •  ASPIRIN 81 PO, Take 81 mg by mouth daily, Disp: , Rfl:   •  atorvastatin (LIPITOR) 10 mg tablet, Take 10 mg by mouth daily, Disp: , Rfl:   •  doxazosin (CARDURA) 4 mg tablet, , Disp: , Rfl:   •  gabapentin (NEURONTIN) 400 mg capsule, , Disp: , Rfl:   •  LORazepam (ATIVAN) 1 mg tablet, Take 1 mg by mouth daily at bedtime, Disp: , Rfl:   •  polyethylene glycol (GlycoLax) 17 GM/SCOOP powder, Take 17 g by mouth daily, Disp: , Rfl:   •  predniSONE 5 mg tablet, , Disp: , Rfl:   •  sertraline (ZOLOFT) 100 mg tablet, , Disp: , Rfl:   •  sodium chloride (OCEAN) 0.65 % nasal spray, 1 spray into each nostril every hour as needed for congestion, Disp: 15 mL, Rfl: 0  •  triamcinolone (KENALOG) 0.1 % cream, Apply topically 2 (two) times a day, Disp: , Rfl:   •  benzonatate (TESSALON PERLES) 100 mg capsule, Take 1 capsule (100 mg total) by mouth 3 (three) times a day as needed for cough (Patient not taking: Reported on 9/26/2023), Disp: 20 capsule, Rfl: 0  •  gabapentin (NEURONTIN) 300 mg capsule, Take 300 mg by mouth 2 (two) times a day 1 in am, 2 in PM (Patient not taking: Reported on 9/26/2023), Disp: , Rfl:   •  guaiFENesin (MUCINEX) 600 mg 12 hr tablet, Take 1 tablet (600 mg total) by mouth every 12 (twelve) hours as needed for cough or congestion (Patient not taking: Reported on 9/26/2023), Disp: 30 tablet, Rfl: 0  •  sertraline (ZOLOFT) 50 mg tablet, , Disp: , Rfl:           • Whom besides the patient is providing clinical information about today's encounter?   o NO ADDITIONAL HISTORIAN (patient alone provided history)    Physical Exam and Assessment/Plan by Diagnosis:    Patient present today for skin check and spots of concern on legs and arms. MELANOCYTIC NEVI ("Moles")    Physical Exam:  • Anatomic Location Affected:   Mostly on sun-exposed areas of the trunk and extremities  • Morphological Description:  Scattered, 1-4mm round to ovoid, symmetrical-appearing, even bordered, skin colored to dark brown macules/papules, mostly in sun-exposed areas  • Pertinent Positives:  • Pertinent Negatives:     Additional History of Present Condition:      Assessment and Plan:  Based on a thorough discussion of this condition and the management approach to it (including a comprehensive discussion of the known risks, side effects and potential benefits of treatment), the patient (family) agrees to implement the following specific plan:  • When outside we recommend using a wide brim hat, sunglasses, long sleeve and pants, sunscreen with SPF 77+ with reapplication every 2 hours, or SPF specific clothing   • Benign, reassured  • Annual skin check     Melanocytic Nevi  Melanocytic nevi ("moles") are tan or brown, raised or flat areas of the skin which have an increased number of melanocytes. Melanocytes are the cells in our body which make pigment and account for skin color. Some moles are present at birth (I.e., "congenital nevi"), while others come up later in life (i.e., "acquired nevi"). The sun can stimulate the body to make more moles. Sunburns are not the only thing that triggers more moles. Chronic sun exposure can do it too. Clinically distinguishing a healthy mole from melanoma may be difficult, even for experienced dermatologists. The "ABCDE's" of moles have been suggested as a means of helping to alert a person to a suspicious mole and the possible increased risk of melanoma. The suggestions for raising alert are as follows:    Asymmetry: Healthy moles tend to be symmetric, while melanomas are often asymmetric. Asymmetry means if you draw a line through the mole, the two halves do not match in color, size, shape, or surface texture. Asymmetry can be a result of rapid enlargement of a mole, the development of a raised area on a previously flat lesion, scaling, ulceration, bleeding or scabbing within the mole. Any mole that starts to demonstrate "asymmetry" should be examined promptly by a board certified dermatologist.     Border: Healthy moles tend to have discrete, even borders. The border of a melanoma often blends into the normal skin and does not sharply delineate the mole from normal skin. Any mole that starts to demonstrate "uneven borders" should be examined promptly by a board certified dermatologist.     Color: Healthy moles tend to be one color throughout. Melanomas tend to be made up of different colors ranging from dark black, blue, white, or red. Any mole that demonstrates a color change should be examined promptly by a board certified dermatologist.     Diameter: Healthy moles tend to be smaller than 0.6 cm in size; an exception are "congenital nevi" that can be larger.   Melanomas tend to grow and can often be greater than 0.6 cm (1/4 of an inch, or the size of a pencil eraser). This is only a guideline, and many normal moles may be larger than 0.6 cm without being unhealthy. Any mole that starts to change in size (small to bigger or bigger to smaller) should be examined promptly by a board certified dermatologist.     Evolving: Healthy moles tend to "stay the same."  Melanomas may often show signs of change or evolution such as a change in size, shape, color, or elevation. Any mole that starts to itch, bleed, crust, burn, hurt, or ulcerate or demonstrate a change or evolution should be examined promptly by a board certified dermatologist.        LENTIGO    Physical Exam:  • Anatomic Location Affected:  trunk, arms  • Morphological Description:  Light brown macules  • Pertinent Positives:  • Pertinent Negatives: Additional History of Present Condition:      Assessment and Plan:  Based on a thorough discussion of this condition and the management approach to it (including a comprehensive discussion of the known risks, side effects and potential benefits of treatment), the patient (family) agrees to implement the following specific plan:  • When outside we recommend using a wide brim hat, sunglasses, long sleeve and pants, sunscreen with SPF 06+ with reapplication every 2 hours, or SPF specific clothing       What is a lentigo? A lentigo is a pigmented flat or slightly raised lesion with a clearly defined edge. Unlike an ephelis (freckle), it does not fade in the winter months. There are several kinds of lentigo. The name lentigo originally referred to its appearance resembling a small lentil. The plural of lentigo is lentigines, although “lentigos” is also in common use. Who gets lentigines? Lentigines can affect males and females of all ages and races. Solar lentigines are especially prevalent in fair skinned adults.  Lentigines associated with syndromes are present at birth or arise during childhood. What causes lentigines? Common forms of lentigo are due to exposure to ultraviolet radiation:  • Sun damage including sunburn   • Indoor tanning   • Phototherapy, especially photochemotherapy (PUVA)    Ionizing radiation, eg radiation therapy, can also cause lentigines. Several familial syndromes associated with widespread lentigines originate from mutations in Mann-MAP kinase, mTOR signaling and PTEN pathways. What is the treatment for lentigines? Most lentigines are left alone. Attempts to lighten them may not be successful. The following approaches are used:  • SPF 50+ broad-spectrum sunscreen   • Hydroquinone bleaching cream   • Alpha hydroxy acids   • Vitamin C   • Retinoids   • Azelaic acid   • Chemical peels  Individual lesions can be permanently removed using:  • Cryotherapy   • Intense pulsed light   • Pigment lasers    How can lentigines be prevented? Lentigines associated with exposure ultraviolet radiation can be prevented by very careful sun protection. Clothing is more successful at preventing new lentigines than are sunscreens. What is the outlook for lentigines? Lentigines usually persist. They may increase in number with age and sun exposure. Some in sun-protected sites may fade and disappear. CHERRY ANGIOMAS    Physical Exam:  • Anatomic Location Affected:  trunk  • Morphological Description:  Scattered cherry red, 1-4 mm papules. • Pertinent Positives:  • Pertinent Negatives: Additional History of Present Condition:      Assessment and Plan:  Based on a thorough discussion of this condition and the management approach to it (including a comprehensive discussion of the known risks, side effects and potential benefits of treatment), the patient (family) agrees to implement the following specific plan:  • Monitor for changes  • Benign, reassured  •     Assessment and Plan:    Cherry angioma, also known as Lynann Walt spots, are benign vascular skin lesions.  A "cherry angioma" is a firm red, blue or purple papule, 0.1-1 cm in diameter. When thrombosed, they can appear black in colour until evaluated with a dermatoscope when the red or purple colour is more easily seen. Cherry angioma may develop on any part of the body but most often appear on the scalp, face, lips and trunk. An angioma is due to proliferating endothelial cells; these are the cells that line the inside of a blood vessel. Angiomas can arise in early life or later in life; the most common type of angioma is a cherry angioma. Cherry angiomas are very common in males and females of any age or race. They are more noticeable in white skin than in skin of colour. They markedly increase in number from about the age of 36. There may be a family history of similar lesions. Eruptive cherry angiomas have been rarely reported to be associated with internal malignancy. The cause of angiomas is unknown. Genetic analysis of cherry angiomas has shown that they frequently carry specific somatic missense mutations in the GNAQ and GNA11 (Q209H) genes, which are involved in other vascular and melanocytic proliferations. SEBORRHEIC KERATOSIS; NON-INFLAMED    Physical Exam:  • Anatomic Location Affected:  trunk  • Morphological Description:  Flat and raised, waxy, smooth to warty textured, yellow to brownish-grey to dark brown to blackish, discrete, "stuck-on" appearing papules. • Pertinent Positives:  • Pertinent Negatives:     Additional History of Present Condition:      Assessment and Plan:  Based on a thorough discussion of this condition and the management approach to it (including a comprehensive discussion of the known risks, side effects and potential benefits of treatment), the patient (family) agrees to implement the following specific plan:  • Monitor for changes  • Benign, reassured  •     Seborrheic Keratosis  A seborrheic keratosis is a harmless warty spot that appears during adult life as a common sign of skin aging. Seborrheic keratoses can arise on any area of skin, covered or uncovered, with the usual exception of the palms and soles. They do not arise from mucous membranes. Seborrheic keratoses can have highly variable appearance. Seborrheic keratoses are extremely common. It has been estimated that over 90% of adults over the age of 61 years have one or more of them. They occur in males and females of all races, typically beginning to erupt in the 35s or 45s. They are uncommon under the age of 21 years. The precise cause of seborrhoeic keratoses is not known. Seborrhoeic keratoses are considered degenerative in nature. As time goes by, seborrheic keratoses tend to become more numerous. Some people inherit a tendency to develop a very large number of them; some people may have hundreds of them. There is no easy way to remove multiple lesions on a single occasion. Unless a specific lesion is "inflamed" and is causing pain or stinging/burning or is bleeding, most insurance companies do not authorize treatment. XEROSIS ("DRY SKIN")    Physical Exam:  • Anatomic Location Affected:  diffuse  • Morphological Description:  xerosis  • Pertinent Positives:  • Pertinent Negatives: Additional History of Present Condition:      Assessment and Plan:  Based on a thorough discussion of this condition and the management approach to it (including a comprehensive discussion of the known risks, side effects and potential benefits of treatment), the patient (family) agrees to implement the following specific plan:  • Use moisturizer like Eucerin,Cerave or Aveeno Cream 3 times a day for the dry skin   •   •    •     Dry skin refers to skin that feels dry to touch. Dry skin has a dull surface with a rough, scaly quality. The skin is less pliable and cracked. When dryness is severe, the skin may become inflamed and fissured.   Although any body site can be dry, dry skin tends to affect the shins more than any other site. Dry skin is lacking moisture in the outer horny cell layer (stratum corneum) and this results in cracks in the skin surface. Dry skin is also called xerosis, xeroderma or asteatosis (lack of fat). It can affect males and females of all ages. There is some racial variability in water and lipid content of the skin. • Dry skin that starts in early childhood may be one of about 20 types of ichthyosis (fish-scale skin). There is often a family history of dry skin. • Dry skin is commonly seen in people with atopic dermatitis. • Nearly everyone > 60 years has dry skin. Dry skin that begins later may be seen in people with certain diseases and conditions. • Postmenopausal women  • Hypothyroidism  • Chronic renal disease   • Malnutrition and weight loss   • Subclinical dermatitis   • Treatment with certain drugs such as oral retinoids, diuretics and epidermal growth factor receptor inhibitors      What is the treatment for dry skin? The mainstay of treatment of dry skin and ichthyosis is moisturisers/emollients. They should be applied liberally and often enough to:  • Reduce itch   • Improve the barrier function   • Prevent entry of irritants, bacteria   • Reduce transepidermal water loss. How can dry skin be prevented? Eliminate aggravating factors:  • Reduce the frequency of bathing. • A humidifier in winter and air conditioner in summer   • Compare having a short shower with a prolonged soak in a bath. • Use lukewarm, not hot, water. • Replace standard soap with a substitute such as a synthetic detergent cleanser, water-miscible emollient, bath oil, anti-pruritic tar oil, colloidal oatmeal etc.   • Apply an emollient liberally and often, particularly shortly after bathing, and when itchy. The drier the skin, the thicker this should be, especially on the hands. What is the outlook for dry skin?   A tendency to dry skin may persist life-long, or it may improve once contributing factors are controlled. ACTINIC KERATOSIS    Physical Exam:  • Anatomic Location Affected: Face   • Morphological Description:  Scaly pink papules  • Pertinent Positives:  • Pertinent Negatives:      Assessment and Plan:  Based on a thorough discussion of this condition and the management approach to it (including a comprehensive discussion of the known risks, side effects and potential benefits of treatment), the patient (family) agrees to implement the following specific plan:  • When outside we recommend using a wide brim hat, sunglasses, long sleeve and pants, sunscreen with SPF 31+ with reapplication every 2 hours, or SPF specific clothing   liquid nitrogen to treat areas. Consent obtained. Expect area to blister, crust, and then fall off within 2 weeks. Please use vaseline. PROCEDURE:  DESTRUCTION OF PRE-MALIGNANT LESIONS  After a thorough discussion of treatment options and risk/benefits/alternatives (including but not limited to local pain, scarring, dyspigmentation, blistering, and possible superinfection), verbal and written consent were obtained and the aforementioned lesions were treated on with cryotherapy using liquid nitrogen x 1 cycle for 5-10 seconds. TOTAL NUMBER of 5 pre-malignant lesions were treated today on the ANATOMIC LOCATION: cheeks and nose. The patient tolerated the procedure well, and after-care instructions were provided. NEOPLASM OF UNCERTAIN BEHAVIOR OF SKIN    Physical Exam:  • (Anatomic Location); (Size and Morphological Description); (Differential Diagnosis):  o Right thigh; 2 x 1 cm crusted pink plaque; diffdx; trauma versus psoriasis versus SCC  • Pertinent Positives:  • Pertinent Negatives:     Additional History of Present Condition:      Assessment and Plan:  • I have discussed with the patient that a sample of skin via a "skin biopsy” would be potentially helpful to further make a specific diagnosis under the microscope. • Based on a thorough discussion of this condition and the management approach to it (including a comprehensive discussion of the known risks, side effects and potential benefits of treatment), the patient (family) agrees to implement the following specific plan:    o Procedure:  Skin Biopsy. After a thorough discussion of treatment options and risk/benefits/alternatives (including but not limited to local pain, scarring, dyspigmentation, blistering, possible superinfection, and inability to confirm a diagnosis via histopathology), verbal and written consent were obtained and portion of the rash was biopsied for tissue sample. See below for consent that was obtained from patient and subsequent Procedure Note. PROCEDURE TANGENTIAL (SHAVE) BIOPSY NOTE:    • Performing Physician: Sissy Rees   • Anatomic Location; Clinical Description with size (cm); Pre-Op Diagnosis:   o Right thigh; 2 x 1 cm crusted pink plaque; diffdx; trauma versus psoriasis versus SCC    • Post-op diagnosis: Same     • Local anesthesia: 1% Lidocaine HCL     • Topical anesthesia: None    • Hemostasis: Electrocautery       After obtaining informed consent  at which time there was a discussion about the purpose of biopsy  and low risks of infection and bleeding. The area was prepped and draped in the usual fashion. Anesthesia was obtained with 1% lidocaine with epinephrine. A shave biopsy to an appropriate sampling depth was obtained by Shave (Dermablade or 15 blade) The resulting wound was covered with surgical ointment and bandaged appropriately. The patient tolerated the procedure well without complications and was without signs of functional compromise. Specimen has been sent for review by Dermatopathology. Standard post-procedure care has been explained and has been included in written form within the patient's copy of Informed Consent.     INFORMED CONSENT DISCUSSION AND POST-OPERATIVE INSTRUCTIONS FOR PATIENT    I. RATIONALE FOR PROCEDURE  I understand that a skin biopsy allows the Dermatologist to test a lesion or rash under the microscope to obtain a diagnosis. It usually involves numbing the area with numbing medication and removing a small piece of skin; sometimes the area will be closed with sutures. In this specific procedure, sutures are not usually needed. If any sutures are placed, then they are usually need to be removed in 2 weeks or less. I understand that my Dermatologist recommends that a skin "shave" biopsy be performed today. A local anesthetic, similar to the kind that a dentist uses when filling a cavity, will be injected with a very small needle into the skin area to be sampled. The injected skin and tissue underneath "will go to sleep” and become numb so no pain should be felt afterwards. An instrument shaped like a tiny "razor blade" (shave biopsy instrument) will be used to cut a small piece of tissue and skin from the area so that a sample of tissue can be taken and examined more closely under the microscope. A slight amount of bleeding will occur, but it will be stopped with direct pressure and a pressure bandage and any other appropriate methods. I understands that a scar will form where the wound was created. Surgical ointment will be applied to help protect the wound. Sutures are not usually needed.     II.  RISKS AND POTENTIAL COMPLICATIONS   I understand the risks and potential complications of a skin biopsy include but are not limited to the following:  • Bleeding  • Infection  • Pain  • Scar/keloid  • Skin discoloration  • Incomplete Removal  • Recurrence  • Nerve Damage/Numbness/Loss of Function  • Allergic Reaction to Anesthesia  • Biopsies are diagnostic procedures and based on findings additional treatment or evaluation may be required  • Loss or destruction of specimen resulting in no additional findings    My Dermatologist has explained to me the nature of the condition, the nature of the procedure, and the benefits to be reasonably expected compared with alternative approaches. My Dermatologist has discussed the likelihood of major risks or complications of this procedure including the specific risks listed above, such as bleeding, infection, and scarring/keloid. I understand that a scar is expected after this procedure. I understand that my physician cannot predict if the scar will form a "keloid," which extends beyond the borders of the wound that is created. A keloid is a thick, painful, and bumpy scar. A keloid can be difficult to treat, as it does not always respond well to therapy, which includes injecting cortisone directly into the keloid every few weeks. While this usually reduces the pain and size of the scar, it does not eliminate it. I understand that photographs may be taken before and after the procedure. These will be maintained as part of the medical providers confidential records and may not be made available to me. I further authorize the medical provider to use the photographs for teaching purposes or to illustrate scientific papers, books, or lectures if in his/her judgment, medical research, education, or science may benefit from its use. I have had an opportunity to fully inquire about the risks and benefits of this procedure and its alternatives. I have been given ample time and opportunity to ask questions and to seek a second opinion if I wished to do so. I acknowledge that there have specifically been no guarantees as to the cosmetic results from the procedure. I am aware that with any procedure there is always the possibility of an unexpected complication. III. POST-PROCEDURAL CARE (WHAT YOU WILL NEED TO DO "AFTER THE BIOPSY" TO OPTIMIZE HEALING)    • Keep the area clean and dry. Try NOT to remove the bandage or get it wet for the first 24 hours.     • Gently clean the area and apply surgical ointment (such as Vaseline petrolatum ointment, which is available "over the counter" and not a prescription) to the biopsy site for up to 2 weeks straight. This acts to protect the wound from the outside world. • Sutures are not usually placed in this procedure. If any sutures were placed, return for suture removal as instructed (generally 1 week for the face, 2 weeks for the body). • Take Acetaminophen (Tylenol) for discomfort, if no contraindications. Ibuprofen or aspirin could make bleeding worse. • Call our office immediately for signs of infection: fever, chills, increased redness, warmth, tenderness, discomfort/pain, or pus or foul smell coming from the wound. WHAT TO DO IF THERE IS ANY BLEEDING? If a small amount of bleeding is noticed, place a clean cloth over the area and apply firm pressure for ten minutes. Check the wound after 10 minutes of direct pressure. If bleeding persists, try one more time for an additional 10 minutes of direct pressure on the area. If the bleeding becomes heavier or does not stop after the second attempt, or if you have any other questions about this procedure, then please call your [facility]'s Dermatologist by calling [phone] (SKIN). I hereby acknowledge that I have reviewed and verified the site with my Dermatologist and have requested and authorized my Dermatologist to proceed with the procedure. Advised to decrease use of triamcinolone. Use more vaseline on legs.     Scribe Attestation    I,:  Jonas Cr MA am acting as a scribe while in the presence of the attending physician.:       I,:  Figueroa Ny MD personally performed the services described in this documentation    as scribed in my presence.:

## 2023-09-26 NOTE — PATIENT INSTRUCTIONS
MELANOCYTIC NEVI ("Moles")      Assessment and Plan:  Based on a thorough discussion of this condition and the management approach to it (including a comprehensive discussion of the known risks, side effects and potential benefits of treatment), the patient (family) agrees to implement the following specific plan:  When outside we recommend using a wide brim hat, sunglasses, long sleeve and pants, sunscreen with SPF 89+ with reapplication every 2 hours, or SPF specific clothing   Benign, reassured  Annual skin check     Melanocytic Nevi  Melanocytic nevi ("moles") are tan or brown, raised or flat areas of the skin which have an increased number of melanocytes. Melanocytes are the cells in our body which make pigment and account for skin color. Some moles are present at birth (I.e., "congenital nevi"), while others come up later in life (i.e., "acquired nevi"). The sun can stimulate the body to make more moles. Sunburns are not the only thing that triggers more moles. Chronic sun exposure can do it too. Clinically distinguishing a healthy mole from melanoma may be difficult, even for experienced dermatologists. The "ABCDE's" of moles have been suggested as a means of helping to alert a person to a suspicious mole and the possible increased risk of melanoma. The suggestions for raising alert are as follows:    Asymmetry: Healthy moles tend to be symmetric, while melanomas are often asymmetric. Asymmetry means if you draw a line through the mole, the two halves do not match in color, size, shape, or surface texture. Asymmetry can be a result of rapid enlargement of a mole, the development of a raised area on a previously flat lesion, scaling, ulceration, bleeding or scabbing within the mole. Any mole that starts to demonstrate "asymmetry" should be examined promptly by a board certified dermatologist.     Border: Healthy moles tend to have discrete, even borders.   The border of a melanoma often blends into the normal skin and does not sharply delineate the mole from normal skin. Any mole that starts to demonstrate "uneven borders" should be examined promptly by a board certified dermatologist.     Color: Healthy moles tend to be one color throughout. Melanomas tend to be made up of different colors ranging from dark black, blue, white, or red. Any mole that demonstrates a color change should be examined promptly by a board certified dermatologist.     Diameter: Healthy moles tend to be smaller than 0.6 cm in size; an exception are "congenital nevi" that can be larger. Melanomas tend to grow and can often be greater than 0.6 cm (1/4 of an inch, or the size of a pencil eraser). This is only a guideline, and many normal moles may be larger than 0.6 cm without being unhealthy. Any mole that starts to change in size (small to bigger or bigger to smaller) should be examined promptly by a board certified dermatologist.     Evolving: Healthy moles tend to "stay the same."  Melanomas may often show signs of change or evolution such as a change in size, shape, color, or elevation. Any mole that starts to itch, bleed, crust, burn, hurt, or ulcerate or demonstrate a change or evolution should be examined promptly by a board certified dermatologist.        Daily Hurtado and Plan:  Based on a thorough discussion of this condition and the management approach to it (including a comprehensive discussion of the known risks, side effects and potential benefits of treatment), the patient (family) agrees to implement the following specific plan:  When outside we recommend using a wide brim hat, sunglasses, long sleeve and pants, sunscreen with SPF 00+ with reapplication every 2 hours, or SPF specific clothing       What is a lentigo? A lentigo is a pigmented flat or slightly raised lesion with a clearly defined edge. Unlike an ephelis (freckle), it does not fade in the winter months.  There are several kinds of lentigo. The name lentigo originally referred to its appearance resembling a small lentil. The plural of lentigo is lentigines, although “lentigos” is also in common use. Who gets lentigines? Lentigines can affect males and females of all ages and races. Solar lentigines are especially prevalent in fair skinned adults. Lentigines associated with syndromes are present at birth or arise during childhood. What causes lentigines? Common forms of lentigo are due to exposure to ultraviolet radiation:  Sun damage including sunburn   Indoor tanning   Phototherapy, especially photochemotherapy (PUVA)    Ionizing radiation, eg radiation therapy, can also cause lentigines. Several familial syndromes associated with widespread lentigines originate from mutations in Mann-MAP kinase, mTOR signaling and PTEN pathways. What is the treatment for lentigines? Most lentigines are left alone. Attempts to lighten them may not be successful. The following approaches are used:  SPF 50+ broad-spectrum sunscreen   Hydroquinone bleaching cream   Alpha hydroxy acids   Vitamin C   Retinoids   Azelaic acid   Chemical peels  Individual lesions can be permanently removed using:  Cryotherapy   Intense pulsed light   Pigment lasers    How can lentigines be prevented? Lentigines associated with exposure ultraviolet radiation can be prevented by very careful sun protection. Clothing is more successful at preventing new lentigines than are sunscreens. What is the outlook for lentigines? Lentigines usually persist. They may increase in number with age and sun exposure. Some in sun-protected sites may fade and disappear.     MACHADO ANGIOMAS        Assessment and Plan:  Based on a thorough discussion of this condition and the management approach to it (including a comprehensive discussion of the known risks, side effects and potential benefits of treatment), the patient (family) agrees to implement the following specific plan:  Monitor for changes  Benign, reassured      Assessment and Plan:    Cherry angioma, also known as [employer] spots, are benign vascular skin lesions. A "cherry angioma" is a firm red, blue or purple papule, 0.1-1 cm in diameter. When thrombosed, they can appear black in colour until evaluated with a dermatoscope when the red or purple colour is more easily seen. Cherry angioma may develop on any part of the body but most often appear on the scalp, face, lips and trunk. An angioma is due to proliferating endothelial cells; these are the cells that line the inside of a blood vessel. Angiomas can arise in early life or later in life; the most common type of angioma is a cherry angioma. Cherry angiomas are very common in males and females of any age or race. They are more noticeable in white skin than in skin of colour. They markedly increase in number from about the age of 36. There may be a family history of similar lesions. Eruptive cherry angiomas have been rarely reported to be associated with internal malignancy. The cause of angiomas is unknown. Genetic analysis of cherry angiomas has shown that they frequently carry specific somatic missense mutations in the GNAQ and GNA11 (Q209H) genes, which are involved in other vascular and melanocytic proliferations. SEBORRHEIC KERATOSIS; NON-INFLAMED        Assessment and Plan:  Based on a thorough discussion of this condition and the management approach to it (including a comprehensive discussion of the known risks, side effects and potential benefits of treatment), the patient (family) agrees to implement the following specific plan:  Monitor for changes  Benign, reassured      Seborrheic Keratosis  A seborrheic keratosis is a harmless warty spot that appears during adult life as a common sign of skin aging. Seborrheic keratoses can arise on any area of skin, covered or uncovered, with the usual exception of the palms and soles.  They do not arise from mucous membranes. Seborrheic keratoses can have highly variable appearance. Seborrheic keratoses are extremely common. It has been estimated that over 90% of adults over the age of 61 years have one or more of them. They occur in males and females of all races, typically beginning to erupt in the 35s or 45s. They are uncommon under the age of 21 years. The precise cause of seborrhoeic keratoses is not known. Seborrhoeic keratoses are considered degenerative in nature. As time goes by, seborrheic keratoses tend to become more numerous. Some people inherit a tendency to develop a very large number of them; some people may have hundreds of them. There is no easy way to remove multiple lesions on a single occasion. Unless a specific lesion is "inflamed" and is causing pain or stinging/burning or is bleeding, most insurance companies do not authorize treatment. XEROSIS ("DRY SKIN")        Assessment and Plan:  Based on a thorough discussion of this condition and the management approach to it (including a comprehensive discussion of the known risks, side effects and potential benefits of treatment), the patient (family) agrees to implement the following specific plan:  Use moisturizer like Eucerin,Cerave or Aveeno Cream 3 times a day for the dry skin            Dry skin refers to skin that feels dry to touch. Dry skin has a dull surface with a rough, scaly quality. The skin is less pliable and cracked. When dryness is severe, the skin may become inflamed and fissured. Although any body site can be dry, dry skin tends to affect the shins more than any other site. Dry skin is lacking moisture in the outer horny cell layer (stratum corneum) and this results in cracks in the skin surface. Dry skin is also called xerosis, xeroderma or asteatosis (lack of fat). It can affect males and females of all ages. There is some racial variability in water and lipid content of the skin.   Dry skin that starts in early childhood may be one of about 20 types of ichthyosis (fish-scale skin). There is often a family history of dry skin. Dry skin is commonly seen in people with atopic dermatitis. Nearly everyone > 60 years has dry skin. Dry skin that begins later may be seen in people with certain diseases and conditions. Postmenopausal women  Hypothyroidism  Chronic renal disease   Malnutrition and weight loss   Subclinical dermatitis   Treatment with certain drugs such as oral retinoids, diuretics and epidermal growth factor receptor inhibitors      What is the treatment for dry skin? The mainstay of treatment of dry skin and ichthyosis is moisturisers/emollients. They should be applied liberally and often enough to:  Reduce itch   Improve the barrier function   Prevent entry of irritants, bacteria   Reduce transepidermal water loss. How can dry skin be prevented? Eliminate aggravating factors:  Reduce the frequency of bathing. A humidifier in winter and air conditioner in summer   Compare having a short shower with a prolonged soak in a bath. Use lukewarm, not hot, water. Replace standard soap with a substitute such as a synthetic detergent cleanser, water-miscible emollient, bath oil, anti-pruritic tar oil, colloidal oatmeal etc.   Apply an emollient liberally and often, particularly shortly after bathing, and when itchy. The drier the skin, the thicker this should be, especially on the hands. What is the outlook for dry skin? A tendency to dry skin may persist life-long, or it may improve once contributing factors are controlled.     ACTINIC KERATOSIS        Assessment and Plan:  Based on a thorough discussion of this condition and the management approach to it (including a comprehensive discussion of the known risks, side effects and potential benefits of treatment), the patient (family) agrees to implement the following specific plan:  When outside we recommend using a wide brim hat, sunglasses, long sleeve and pants, sunscreen with SPF 02+ with reapplication every 2 hours, or SPF specific clothing   liquid nitrogen to treat areas. Consent obtained. Expect area to blister, crust, and then fall off within 2 weeks. Please use vaseline.

## 2023-09-28 PROCEDURE — 88313 SPECIAL STAINS GROUP 2: CPT | Performed by: STUDENT IN AN ORGANIZED HEALTH CARE EDUCATION/TRAINING PROGRAM

## 2023-09-28 PROCEDURE — 88305 TISSUE EXAM BY PATHOLOGIST: CPT | Performed by: STUDENT IN AN ORGANIZED HEALTH CARE EDUCATION/TRAINING PROGRAM

## 2023-09-28 NOTE — RESULT ENCOUNTER NOTE
DERMATOPATHOLOGY RESULT NOTE    Results reviewed by ordering physician. Instructions for Clinical Derm Team:   (remember to route Result Note to appropriate staff):    Call patient and review result. Biopsy returned as his psoriasis, not skin cancer. Can use the trimacinolone.     Result & Plan by Specimen:    Specimen A: psoriasis  Plan: triamcinolone